# Patient Record
Sex: MALE | Race: OTHER | HISPANIC OR LATINO | URBAN - METROPOLITAN AREA
[De-identification: names, ages, dates, MRNs, and addresses within clinical notes are randomized per-mention and may not be internally consistent; named-entity substitution may affect disease eponyms.]

---

## 2017-11-28 ENCOUNTER — EMERGENCY (EMERGENCY)
Facility: HOSPITAL | Age: 47
LOS: 1 days | Discharge: HOME | End: 2017-11-28
Attending: EMERGENCY MEDICINE | Admitting: EMERGENCY MEDICINE
Payer: COMMERCIAL

## 2017-11-28 VITALS
SYSTOLIC BLOOD PRESSURE: 114 MMHG | OXYGEN SATURATION: 98 % | DIASTOLIC BLOOD PRESSURE: 60 MMHG | TEMPERATURE: 98 F | RESPIRATION RATE: 18 BRPM | HEART RATE: 75 BPM

## 2017-11-28 PROCEDURE — 99283 EMERGENCY DEPT VISIT LOW MDM: CPT

## 2017-11-28 NOTE — ED PROVIDER NOTE - PHYSICAL EXAMINATION
ATTENDING PHYSICAL EXAM DR. CADE ***GEN - NAD; well appearing; A+O x3   ***PULMONARY - CTA b/l, symmetric breath sounds. ***CARDIAC -s1s2, RRR, no M,G,R  ***ABDOMEN - +BS, ND, NT, soft, no guarding, no rebound, no masses   ***BACK - no CVA tenderness, Normal  spine ***EXTREMITIES - symmetric pulses, 2+ dp, capillary refill < 2 seconds, no clubbing, no cyanosis, no edema, +tenderness along extensor hallux tendon ***SKIN - no rash or bruising   ***NEUROLOGIC - alert

## 2017-11-28 NOTE — ED PROVIDER NOTE - OBJECTIVE STATEMENT
46 y/o M w/ no significant PMHx, presents to the ED c/o atraumatic, right ankle pain since last night, worse this morning. Pain is worse w/ bearing weight and improved w/ rest. Pt is currently ambulatory. Denies trauma, fall, numbness/tingling, weakness or any other complaints. NKDA.

## 2017-11-28 NOTE — ED PROVIDER NOTE - MEDICAL DECISION MAKING DETAILS
48 y/o M c/o atraumatic right ankle pain. Benign exam. Plan: supportive care, ace wrap, pain meds and dc home.

## 2019-01-03 ENCOUNTER — EMERGENCY (EMERGENCY)
Facility: HOSPITAL | Age: 49
LOS: 1 days | Discharge: HOME | End: 2019-01-03
Attending: EMERGENCY MEDICINE | Admitting: EMERGENCY MEDICINE
Payer: MEDICAID

## 2019-01-03 VITALS
HEART RATE: 67 BPM | DIASTOLIC BLOOD PRESSURE: 58 MMHG | TEMPERATURE: 98 F | RESPIRATION RATE: 18 BRPM | OXYGEN SATURATION: 99 % | SYSTOLIC BLOOD PRESSURE: 124 MMHG

## 2019-01-03 PROCEDURE — 99283 EMERGENCY DEPT VISIT LOW MDM: CPT

## 2019-01-03 RX ORDER — KETOROLAC TROMETHAMINE 30 MG/ML
60 SYRINGE (ML) INJECTION ONCE
Qty: 0 | Refills: 0 | Status: DISCONTINUED | OUTPATIENT
Start: 2019-01-03 | End: 2019-01-03

## 2019-01-03 RX ORDER — IBUPROFEN 200 MG
1 TABLET ORAL
Qty: 20 | Refills: 0 | OUTPATIENT
Start: 2019-01-03

## 2019-01-03 RX ORDER — CYCLOBENZAPRINE HYDROCHLORIDE 10 MG/1
1 TABLET, FILM COATED ORAL
Qty: 12 | Refills: 0 | OUTPATIENT
Start: 2019-01-03

## 2019-01-03 RX ORDER — CYCLOBENZAPRINE HYDROCHLORIDE 10 MG/1
10 TABLET, FILM COATED ORAL ONCE
Qty: 0 | Refills: 0 | Status: COMPLETED | OUTPATIENT
Start: 2019-01-03 | End: 2019-01-03

## 2019-01-03 RX ORDER — LIDOCAINE 4 G/100G
1 CREAM TOPICAL ONCE
Qty: 0 | Refills: 0 | Status: COMPLETED | OUTPATIENT
Start: 2019-01-03 | End: 2019-01-03

## 2019-01-03 RX ADMIN — CYCLOBENZAPRINE HYDROCHLORIDE 10 MILLIGRAM(S): 10 TABLET, FILM COATED ORAL at 13:02

## 2019-01-03 RX ADMIN — LIDOCAINE 1 PATCH: 4 CREAM TOPICAL at 13:02

## 2019-01-03 RX ADMIN — Medication 60 MILLIGRAM(S): at 13:02

## 2019-01-03 NOTE — ED PROVIDER NOTE - PROGRESS NOTE DETAILS
Pt states feeling better. Ambulating without any difficulty. Sent Flexeril and Naproxen to pharmacy. Given Ortho f/u.

## 2019-01-03 NOTE — ED PROVIDER NOTE - MUSCULOSKELETAL MINIMAL EXAM
Able to ambulate. No midline tenderness. No swelling. No fluctuants. 5/5 strength bilateral lower extremity. Positive DP

## 2019-01-03 NOTE — ED PROVIDER NOTE - CHPI ED SYMPTOMS NEG
no numbness/no weakness/no tingling/no fever. no chills. no nausea. no vomiting. no CP. no SOB. no abd pain

## 2019-01-03 NOTE — ED PROVIDER NOTE - NSFOLLOWUPINSTRUCTIONS_ED_ALL_ED_FT
Rest and avoid heavy lifting.   May apply warm pack to area, 20 min on, 20 min off.  May take Naproxen 500mg 2 times per day for moderate pain. Take with food.  May take Flexeril 10mg every 6 hours as needed for severe pain.   Follow up with Orthopedics as discussed.

## 2019-01-03 NOTE — ED PROVIDER NOTE - OBJECTIVE STATEMENT
48y M with previous hx of sciatica presents to the ED for low back/left leg pain x4 days. Pt states over the past 4 days whenever going from sitting to standing position having increased left low back pain radiating down to left leg. Pt denies fall or trauma to area. Denies numbness, tingling, or weakness. Denies loss of bowel or bladder incontinence. Denies fever, chills, nausea, vomiting, SP, SOB, or abd pain

## 2019-01-31 PROBLEM — M54.30 SCIATICA, UNSPECIFIED SIDE: Chronic | Status: ACTIVE | Noted: 2019-01-03

## 2019-02-01 ENCOUNTER — APPOINTMENT (OUTPATIENT)
Dept: ORTHOPEDIC SURGERY | Facility: CLINIC | Age: 49
End: 2019-02-01
Payer: MEDICAID

## 2019-02-01 VITALS
SYSTOLIC BLOOD PRESSURE: 115 MMHG | BODY MASS INDEX: 35.78 KG/M2 | HEIGHT: 73 IN | HEART RATE: 75 BPM | WEIGHT: 270 LBS | DIASTOLIC BLOOD PRESSURE: 79 MMHG

## 2019-02-01 DIAGNOSIS — M48.061 SPINAL STENOSIS, LUMBAR REGION WITHOUT NEUROGENIC CLAUDICATION: ICD-10-CM

## 2019-02-01 PROCEDURE — 72100 X-RAY EXAM L-S SPINE 2/3 VWS: CPT

## 2019-02-01 PROCEDURE — 99203 OFFICE O/P NEW LOW 30 MIN: CPT

## 2019-02-01 NOTE — PHYSICAL EXAM
[UE/LE] : Sensory: Intact in bilateral upper & lower extremities [ALL] : dorsalis pedis, posterior tibial, femoral, popliteal, and radial 2+ and symmetric bilaterally [Antalgic] : antalgic [SLR] : positive straight leg raise [de-identified] : Patient is antalgic to the left with 4-5 weakness tibialis anterior and extensor hallucis longus with decreased sensation in the L5 distribution to his right lower extremity otherwise, 5 out of 5 motor strength, sensation is intact and symmetrical full range of motion flexion extension and rotation, no palpatory tenderness full range of motion of hips knees shoulders and elbows (all four extremities), no atrophy, negative straight leg raise, no pathological reflexes, no swelling, normal ambulation, no apparent distress skin is intact, no palpable lymph nodes, no upper or lower extremity instability, alert and oriented x3 and normal mood. Normal finger-to nose test. He has a right positive straight leg raise at 20° reproducing pain past his knee.\par No perineal buttock numbness today. [de-identified] : MRI reveals a very large disc herniation in his lumbar spine at L4-5 producing severe spinal stenosis.\par AP lateral lumbar does not reveal any instability. Both reviewed with the patient.\par

## 2019-02-01 NOTE — HISTORY OF PRESENT ILLNESS
[Worsening] : worsening [Walking] : walking [Bending] : worsened by bending [Lifting] : worsened by lifting [de-identified] : Patient is miserable with right lower extremity pain numbness and tingling. Her going on for several weeks. He is miserable and worsening with a decreased quality of life.\par He try chiropractic care and different modalities and medications.\par He finds it difficult to go to work and difficult with his activities of daily living.\par Lower back pain\par No fever chills sweats nausea vomiting no bowel or bladder dysfunction, no recent weight loss or gain no night pain. This history is in addition to the intake form that I personally reviewed.\par

## 2019-02-01 NOTE — ADDENDUM
[FreeTextEntry1] : This note was authored by Richelle Salmon working as a medical scribe for Dr. Irvin Glover. The note was reviewed, edited, and revised by Dr. Irvin Glover whom is in agreement with the exam findings, imaging findings, and treatment plan. 02/01/2019.

## 2019-02-01 NOTE — REVIEW OF SYSTEMS
[Negative] : Heme/Lymph [Headache] : no headache [Dizziness] : no dizziness [Fainting] : no fainting

## 2019-02-01 NOTE — DISCUSSION/SUMMARY
[Surgical risks reviewed] : Surgical risks reviewed [de-identified] : Lumbar stenosis.\par Large herniated disc L4-5 producing severe stenosis.\par He has no signs of cauda equina syndrome.\par If he develops any apparent ill numbness of the numbness of bilaterally painful go to the emergency room.\par Risks of surgery include infection, dural tear, nerve root injury, reherniation, future back pain, future leg pain, retained fragment, hematoma, urinary retention, worsening leg symptoms, footdrop, anesthetic risks, blood transfusion risks, positioning pain, visceral and vascular injury, deep vein thrombosis, pulmonary embolus, and death. Patient will need spinal orthosis pre and post operatively. All risks were explained not exclusive to the ones mentioned alternatives were discussed and all questions were answered the patient agrees and understands the above and is in complete agreement with the plan. \par Website provided\par Laminectomy\par All options discussed including rest, medicine, home exercise, acupuncture, Chiropractic care, Physical Therapy, Pain management, and last resort surgery.\par All questions were answered, all alternatives discussed and the patient is in complete agreement with that plan. Follow-up appointment as instructed. Any issues and the patient will call or come in sooner.\par Surgery will be tentatively scheduled pending medical clearance and insurance approval.

## 2019-02-08 ENCOUNTER — OUTPATIENT (OUTPATIENT)
Dept: OUTPATIENT SERVICES | Facility: HOSPITAL | Age: 49
LOS: 1 days | End: 2019-02-08
Payer: MEDICAID

## 2019-02-08 VITALS
SYSTOLIC BLOOD PRESSURE: 116 MMHG | HEART RATE: 86 BPM | WEIGHT: 287.92 LBS | RESPIRATION RATE: 16 BRPM | OXYGEN SATURATION: 98 % | HEIGHT: 72.5 IN | DIASTOLIC BLOOD PRESSURE: 78 MMHG | TEMPERATURE: 98 F

## 2019-02-08 DIAGNOSIS — M51.26 OTHER INTERVERTEBRAL DISC DISPLACEMENT, LUMBAR REGION: ICD-10-CM

## 2019-02-08 DIAGNOSIS — D49.7 NEOPLASM OF UNSPECIFIED BEHAVIOR OF ENDOCRINE GLANDS AND OTHER PARTS OF NERVOUS SYSTEM: ICD-10-CM

## 2019-02-08 DIAGNOSIS — E11.9 TYPE 2 DIABETES MELLITUS WITHOUT COMPLICATIONS: ICD-10-CM

## 2019-02-08 LAB
ANION GAP SERPL CALC-SCNC: 13 MMO/L — SIGNIFICANT CHANGE UP (ref 7–14)
BLD GP AB SCN SERPL QL: NEGATIVE — SIGNIFICANT CHANGE UP
BUN SERPL-MCNC: 13 MG/DL — SIGNIFICANT CHANGE UP (ref 7–23)
CALCIUM SERPL-MCNC: 9.8 MG/DL — SIGNIFICANT CHANGE UP (ref 8.4–10.5)
CHLORIDE SERPL-SCNC: 101 MMOL/L — SIGNIFICANT CHANGE UP (ref 98–107)
CO2 SERPL-SCNC: 26 MMOL/L — SIGNIFICANT CHANGE UP (ref 22–31)
CREAT SERPL-MCNC: 0.81 MG/DL — SIGNIFICANT CHANGE UP (ref 0.5–1.3)
GLUCOSE SERPL-MCNC: 109 MG/DL — HIGH (ref 70–99)
HBA1C BLD-MCNC: 5.6 % — SIGNIFICANT CHANGE UP (ref 4–5.6)
HCT VFR BLD CALC: 48.2 % — SIGNIFICANT CHANGE UP (ref 39–50)
HGB BLD-MCNC: 15.7 G/DL — SIGNIFICANT CHANGE UP (ref 13–17)
MCHC RBC-ENTMCNC: 27.3 PG — SIGNIFICANT CHANGE UP (ref 27–34)
MCHC RBC-ENTMCNC: 32.6 % — SIGNIFICANT CHANGE UP (ref 32–36)
MCV RBC AUTO: 83.7 FL — SIGNIFICANT CHANGE UP (ref 80–100)
NRBC # FLD: 0 K/UL — LOW (ref 25–125)
PLATELET # BLD AUTO: 272 K/UL — SIGNIFICANT CHANGE UP (ref 150–400)
PMV BLD: 10.1 FL — SIGNIFICANT CHANGE UP (ref 7–13)
POTASSIUM SERPL-MCNC: 4.4 MMOL/L — SIGNIFICANT CHANGE UP (ref 3.5–5.3)
POTASSIUM SERPL-SCNC: 4.4 MMOL/L — SIGNIFICANT CHANGE UP (ref 3.5–5.3)
RBC # BLD: 5.76 M/UL — SIGNIFICANT CHANGE UP (ref 4.2–5.8)
RBC # FLD: 13.5 % — SIGNIFICANT CHANGE UP (ref 10.3–14.5)
RH IG SCN BLD-IMP: POSITIVE — SIGNIFICANT CHANGE UP
SODIUM SERPL-SCNC: 140 MMOL/L — SIGNIFICANT CHANGE UP (ref 135–145)
WBC # BLD: 7.67 K/UL — SIGNIFICANT CHANGE UP (ref 3.8–10.5)
WBC # FLD AUTO: 7.67 K/UL — SIGNIFICANT CHANGE UP (ref 3.8–10.5)

## 2019-02-08 PROCEDURE — 93010 ELECTROCARDIOGRAM REPORT: CPT

## 2019-02-08 RX ORDER — SODIUM CHLORIDE 9 MG/ML
1000 INJECTION, SOLUTION INTRAVENOUS
Qty: 0 | Refills: 0 | Status: DISCONTINUED | OUTPATIENT
Start: 2019-02-26 | End: 2019-02-27

## 2019-02-08 NOTE — H&P PST ADULT - NEGATIVE NEUROLOGICAL SYMPTOMS
no transient paralysis/no loss of sensation/no vertigo/no difficulty walking/no headache/no generalized seizures

## 2019-02-08 NOTE — H&P PST ADULT - NEGATIVE MUSCULOSKELETAL SYMPTOMS
no stiffness/no arm pain R/no muscle weakness/no muscle cramps/no neck pain/no arthralgia/no joint swelling/no myalgia

## 2019-02-08 NOTE — H&P PST ADULT - PRIMARY CARE PROVIDER
Dr. Alexy Duggan(PCP) [phone] Dr. Alexy Duggan(PCP) [phone], last appointment Dec 2018/Jan 2019

## 2019-02-08 NOTE — H&P PST ADULT - MUSCULOSKELETAL
details… detailed exam no joint erythema/no joint warmth/normal strength/no calf tenderness/no joint swelling/ROM intact

## 2019-02-08 NOTE — H&P PST ADULT - HISTORY OF PRESENT ILLNESS
48 yo male presents to PST.  Pt reports he has been having lower back pain about 1-2 months ago, becoming more severe radiating down to the left leg.  Pt reports MRI Jan 2019, revealed lumbar herniated disc.   Pt is scheduled for L4-5 lumbar laminectomy on 2/26/19. 47 yo male presents to PST.  Pt reports he has been having lower back pain about 1-2 months ago, becoming more severe radiating down to the left leg.  Pt reports MRI Jan 2019, revealed lumbar herniated disc.   Pt is scheduled for L4-5 lumbar laminectomy on 2/26/19.

## 2019-02-08 NOTE — H&P PST ADULT - NEGATIVE ENMT SYMPTOMS
no dysphagia/no tinnitus/no ear pain/no vertigo/no sinus symptoms/no nasal congestion/no hearing difficulty

## 2019-02-08 NOTE — H&P PST ADULT - PROBLEM SELECTOR PLAN 1
Pt is scheduled for L4-5 lumbar laminectomy on 2/26/19.  Pre op instructions including chlorhexidine wash given and pt able to verbalize understanding. Pt is scheduled for L4-5 lumbar laminectomy on 2/26/19.  Pre op instructions including chlorhexidine wash given and pt able to verbalize understanding.  Pt met STOP BANG score for RONNY precaution. OR booking notified via fax.

## 2019-02-08 NOTE — H&P PST ADULT - PMH
DM (diabetes mellitus)  Metformin discontinued 2012  Other intervertebral disc displacement, lumbar region    Pituitary tumor  dx in 2011  Sciatica    Seizure  last one at age 14, no longer under care of neurologist

## 2019-02-09 LAB — SPECIMEN SOURCE: SIGNIFICANT CHANGE UP

## 2019-02-11 LAB — BACTERIA NPH CULT: SIGNIFICANT CHANGE UP

## 2019-02-25 ENCOUNTER — TRANSCRIPTION ENCOUNTER (OUTPATIENT)
Age: 49
End: 2019-02-25

## 2019-02-26 ENCOUNTER — APPOINTMENT (OUTPATIENT)
Dept: ORTHOPEDIC SURGERY | Facility: HOSPITAL | Age: 49
End: 2019-02-26
Payer: MEDICAID

## 2019-02-26 ENCOUNTER — RESULT REVIEW (OUTPATIENT)
Age: 49
End: 2019-02-26

## 2019-02-26 ENCOUNTER — INPATIENT (INPATIENT)
Facility: HOSPITAL | Age: 49
LOS: 0 days | Discharge: HOME | End: 2019-02-27
Attending: ORTHOPAEDIC SURGERY | Admitting: ORTHOPAEDIC SURGERY
Payer: MEDICAID

## 2019-02-26 VITALS
WEIGHT: 279.99 LBS | HEART RATE: 63 BPM | HEIGHT: 73 IN | RESPIRATION RATE: 16 BRPM | SYSTOLIC BLOOD PRESSURE: 113 MMHG | DIASTOLIC BLOOD PRESSURE: 66 MMHG | TEMPERATURE: 98 F | OXYGEN SATURATION: 96 %

## 2019-02-26 DIAGNOSIS — M51.26 OTHER INTERVERTEBRAL DISC DISPLACEMENT, LUMBAR REGION: ICD-10-CM

## 2019-02-26 LAB
ANION GAP SERPL CALC-SCNC: 12 MMO/L — SIGNIFICANT CHANGE UP (ref 7–14)
BASOPHILS # BLD AUTO: 0.06 K/UL — SIGNIFICANT CHANGE UP (ref 0–0.2)
BASOPHILS NFR BLD AUTO: 0.6 % — SIGNIFICANT CHANGE UP (ref 0–2)
BUN SERPL-MCNC: 15 MG/DL — SIGNIFICANT CHANGE UP (ref 7–23)
CALCIUM SERPL-MCNC: 9.2 MG/DL — SIGNIFICANT CHANGE UP (ref 8.4–10.5)
CHLORIDE SERPL-SCNC: 103 MMOL/L — SIGNIFICANT CHANGE UP (ref 98–107)
CO2 SERPL-SCNC: 25 MMOL/L — SIGNIFICANT CHANGE UP (ref 22–31)
CREAT SERPL-MCNC: 0.97 MG/DL — SIGNIFICANT CHANGE UP (ref 0.5–1.3)
EOSINOPHIL # BLD AUTO: 0.12 K/UL — SIGNIFICANT CHANGE UP (ref 0–0.5)
EOSINOPHIL NFR BLD AUTO: 1.3 % — SIGNIFICANT CHANGE UP (ref 0–6)
GLUCOSE BLDC GLUCOMTR-MCNC: 103 MG/DL — HIGH (ref 70–99)
GLUCOSE SERPL-MCNC: 143 MG/DL — HIGH (ref 70–99)
HCT VFR BLD CALC: 40.3 % — SIGNIFICANT CHANGE UP (ref 39–50)
HGB BLD-MCNC: 13.2 G/DL — SIGNIFICANT CHANGE UP (ref 13–17)
IMM GRANULOCYTES NFR BLD AUTO: 0.9 % — SIGNIFICANT CHANGE UP (ref 0–1.5)
LYMPHOCYTES # BLD AUTO: 1.34 K/UL — SIGNIFICANT CHANGE UP (ref 1–3.3)
LYMPHOCYTES # BLD AUTO: 14.3 % — SIGNIFICANT CHANGE UP (ref 13–44)
MCHC RBC-ENTMCNC: 27.4 PG — SIGNIFICANT CHANGE UP (ref 27–34)
MCHC RBC-ENTMCNC: 32.8 % — SIGNIFICANT CHANGE UP (ref 32–36)
MCV RBC AUTO: 83.8 FL — SIGNIFICANT CHANGE UP (ref 80–100)
MONOCYTES # BLD AUTO: 0.23 K/UL — SIGNIFICANT CHANGE UP (ref 0–0.9)
MONOCYTES NFR BLD AUTO: 2.5 % — SIGNIFICANT CHANGE UP (ref 2–14)
NEUTROPHILS # BLD AUTO: 7.55 K/UL — HIGH (ref 1.8–7.4)
NEUTROPHILS NFR BLD AUTO: 80.4 % — HIGH (ref 43–77)
NRBC # FLD: 0 K/UL — LOW (ref 25–125)
PLATELET # BLD AUTO: 246 K/UL — SIGNIFICANT CHANGE UP (ref 150–400)
PMV BLD: 10.4 FL — SIGNIFICANT CHANGE UP (ref 7–13)
POTASSIUM SERPL-MCNC: 4.6 MMOL/L — SIGNIFICANT CHANGE UP (ref 3.5–5.3)
POTASSIUM SERPL-SCNC: 4.6 MMOL/L — SIGNIFICANT CHANGE UP (ref 3.5–5.3)
RBC # BLD: 4.81 M/UL — SIGNIFICANT CHANGE UP (ref 4.2–5.8)
RBC # FLD: 13.5 % — SIGNIFICANT CHANGE UP (ref 10.3–14.5)
RH IG SCN BLD-IMP: POSITIVE — SIGNIFICANT CHANGE UP
SODIUM SERPL-SCNC: 140 MMOL/L — SIGNIFICANT CHANGE UP (ref 135–145)
WBC # BLD: 9.38 K/UL — SIGNIFICANT CHANGE UP (ref 3.8–10.5)
WBC # FLD AUTO: 9.38 K/UL — SIGNIFICANT CHANGE UP (ref 3.8–10.5)

## 2019-02-26 PROCEDURE — 72100 X-RAY EXAM L-S SPINE 2/3 VWS: CPT | Mod: 26

## 2019-02-26 PROCEDURE — ZZZZZ: CPT

## 2019-02-26 PROCEDURE — 88304 TISSUE EXAM BY PATHOLOGIST: CPT | Mod: 26

## 2019-02-26 RX ORDER — FAMOTIDINE 10 MG/ML
20 INJECTION INTRAVENOUS EVERY 12 HOURS
Qty: 0 | Refills: 0 | Status: DISCONTINUED | OUTPATIENT
Start: 2019-02-26 | End: 2019-02-27

## 2019-02-26 RX ORDER — HYDROMORPHONE HYDROCHLORIDE 2 MG/ML
0.5 INJECTION INTRAMUSCULAR; INTRAVENOUS; SUBCUTANEOUS
Qty: 0 | Refills: 0 | Status: DISCONTINUED | OUTPATIENT
Start: 2019-02-26 | End: 2019-02-26

## 2019-02-26 RX ORDER — INFLUENZA VIRUS VACCINE 15; 15; 15; 15 UG/.5ML; UG/.5ML; UG/.5ML; UG/.5ML
0.5 SUSPENSION INTRAMUSCULAR ONCE
Qty: 0 | Refills: 0 | Status: COMPLETED | OUTPATIENT
Start: 2019-02-26 | End: 2019-02-26

## 2019-02-26 RX ORDER — HYDROMORPHONE HYDROCHLORIDE 2 MG/ML
0.5 INJECTION INTRAMUSCULAR; INTRAVENOUS; SUBCUTANEOUS EVERY 6 HOURS
Qty: 0 | Refills: 0 | Status: DISCONTINUED | OUTPATIENT
Start: 2019-02-26 | End: 2019-02-27

## 2019-02-26 RX ORDER — OXYCODONE HYDROCHLORIDE 5 MG/1
5 TABLET ORAL EVERY 4 HOURS
Qty: 0 | Refills: 0 | Status: DISCONTINUED | OUTPATIENT
Start: 2019-02-26 | End: 2019-02-27

## 2019-02-26 RX ORDER — SODIUM CHLORIDE 9 MG/ML
1000 INJECTION, SOLUTION INTRAVENOUS
Qty: 0 | Refills: 0 | Status: DISCONTINUED | OUTPATIENT
Start: 2019-02-26 | End: 2019-02-27

## 2019-02-26 RX ORDER — OXYCODONE HYDROCHLORIDE 5 MG/1
10 TABLET ORAL EVERY 4 HOURS
Qty: 0 | Refills: 0 | Status: DISCONTINUED | OUTPATIENT
Start: 2019-02-26 | End: 2019-02-27

## 2019-02-26 RX ORDER — ONDANSETRON 8 MG/1
4 TABLET, FILM COATED ORAL ONCE
Qty: 0 | Refills: 0 | Status: DISCONTINUED | OUTPATIENT
Start: 2019-02-26 | End: 2019-02-26

## 2019-02-26 RX ORDER — MAGNESIUM HYDROXIDE 400 MG/1
30 TABLET, CHEWABLE ORAL EVERY 12 HOURS
Qty: 0 | Refills: 0 | Status: DISCONTINUED | OUTPATIENT
Start: 2019-02-26 | End: 2019-02-27

## 2019-02-26 RX ORDER — SENNA PLUS 8.6 MG/1
2 TABLET ORAL AT BEDTIME
Qty: 0 | Refills: 0 | Status: DISCONTINUED | OUTPATIENT
Start: 2019-02-26 | End: 2019-02-27

## 2019-02-26 RX ORDER — ACETAMINOPHEN 500 MG
650 TABLET ORAL EVERY 6 HOURS
Qty: 0 | Refills: 0 | Status: DISCONTINUED | OUTPATIENT
Start: 2019-02-26 | End: 2019-02-27

## 2019-02-26 RX ORDER — GLUCAGON INJECTION, SOLUTION 0.5 MG/.1ML
1 INJECTION, SOLUTION SUBCUTANEOUS ONCE
Qty: 0 | Refills: 0 | Status: DISCONTINUED | OUTPATIENT
Start: 2019-02-26 | End: 2019-02-27

## 2019-02-26 RX ORDER — CEFAZOLIN SODIUM 1 G
2000 VIAL (EA) INJECTION EVERY 8 HOURS
Qty: 0 | Refills: 0 | Status: COMPLETED | OUTPATIENT
Start: 2019-02-27 | End: 2019-02-27

## 2019-02-26 RX ORDER — FENTANYL CITRATE 50 UG/ML
50 INJECTION INTRAVENOUS
Qty: 0 | Refills: 0 | Status: DISCONTINUED | OUTPATIENT
Start: 2019-02-26 | End: 2019-02-26

## 2019-02-26 RX ORDER — DOCUSATE SODIUM 100 MG
100 CAPSULE ORAL THREE TIMES A DAY
Qty: 0 | Refills: 0 | Status: DISCONTINUED | OUTPATIENT
Start: 2019-02-26 | End: 2019-02-27

## 2019-02-26 RX ORDER — CYCLOBENZAPRINE HYDROCHLORIDE 10 MG/1
10 TABLET, FILM COATED ORAL EVERY 8 HOURS
Qty: 0 | Refills: 0 | Status: DISCONTINUED | OUTPATIENT
Start: 2019-02-26 | End: 2019-02-27

## 2019-02-26 RX ORDER — GABAPENTIN 400 MG/1
100 CAPSULE ORAL THREE TIMES A DAY
Qty: 0 | Refills: 0 | Status: DISCONTINUED | OUTPATIENT
Start: 2019-02-26 | End: 2019-02-27

## 2019-02-26 RX ADMIN — Medication 100 MILLIGRAM(S): at 22:50

## 2019-02-26 RX ADMIN — OXYCODONE HYDROCHLORIDE 10 MILLIGRAM(S): 5 TABLET ORAL at 22:49

## 2019-02-26 RX ADMIN — GABAPENTIN 100 MILLIGRAM(S): 400 CAPSULE ORAL at 22:48

## 2019-02-26 RX ADMIN — OXYCODONE HYDROCHLORIDE 10 MILLIGRAM(S): 5 TABLET ORAL at 23:41

## 2019-02-26 RX ADMIN — SENNA PLUS 2 TABLET(S): 8.6 TABLET ORAL at 22:50

## 2019-02-26 NOTE — BRIEF OPERATIVE NOTE - PROCEDURE
<<-----Click on this checkbox to enter Procedure Laminectomy, decompressive, with discectomy, spine, lumbar, L4 to L5  02/26/2019    Active  ASATIN

## 2019-02-26 NOTE — PROGRESS NOTE ADULT - SUBJECTIVE AND OBJECTIVE BOX
ORTHO POST OP NOTE    Pt resting comfortably without complaint. No chest pain/no dizziness/no SOB. Reports pain is minimal.       Vital Signs Last 24 Hrs  T(C): 37.3 (26 Feb 2019 19:25), Max: 37.3 (26 Feb 2019 19:25)  T(F): 99.1 (26 Feb 2019 19:25), Max: 99.1 (26 Feb 2019 19:25)  HR: 65 (26 Feb 2019 20:00) (62 - 84)  BP: 107/91 (26 Feb 2019 20:00) (107/91 - 132/69)  BP(mean): 95 (26 Feb 2019 20:00) (80 - 95)  RR: 15 (26 Feb 2019 20:00) (10 - 17)  SpO2: 100% (26 Feb 2019 20:00) (94% - 100%)        Spine: Dressing/ Incision Clean/Dry/Intact  HV in place  BLE exam:  EHL/FHL/GC/TA 5/5                     Sensation grossly intact to light touch distal                     DP pulse 2+      A/P: 49y Male s/p L4/5 laminectomy/discectomy, POD #0    - WBAT  -DVT ppx- venodynes + ambulation  - Pain control  -Incentive spirometer  -FU AM labs  -PT/OT  -Continue to monitor. Notify Ortho with any questions. ORTHO POST OP NOTE    Pt resting comfortably without complaint. No chest pain/no dizziness/no SOB. Reports pain is minimal. Denies any numbness/tingling. No radicular symptoms at this time.       Vital Signs Last 24 Hrs  T(C): 37.3 (26 Feb 2019 19:25), Max: 37.3 (26 Feb 2019 19:25)  T(F): 99.1 (26 Feb 2019 19:25), Max: 99.1 (26 Feb 2019 19:25)  HR: 65 (26 Feb 2019 20:00) (62 - 84)  BP: 107/91 (26 Feb 2019 20:00) (107/91 - 132/69)  BP(mean): 95 (26 Feb 2019 20:00) (80 - 95)  RR: 15 (26 Feb 2019 20:00) (10 - 17)  SpO2: 100% (26 Feb 2019 20:00) (94% - 100%)        Spine: Dressing/ Incision Clean/Dry/Intact  HV in place  BLE exam:  EHL/FHL/GC/TA 5/5                     Sensation grossly intact to light touch distal                     DP pulse 2+      A/P: 49y Male s/p L4/5 laminectomy/discectomy, POD #0    - WBAT  -DVT ppx- venodynes + ambulation  - Pain control  -Incentive spirometer  -FU AM labs  -PT/OT  -Continue to monitor. Notify Ortho with any questions. ORTHO POST OP NOTE    Pt resting comfortably without complaint. No chest pain/no dizziness/no SOB. Reports pain is minimal. Denies any numbness/tingling. No radicular symptoms at this time.       Vital Signs Last 24 Hrs  T(C): 37.3 (26 Feb 2019 19:25), Max: 37.3 (26 Feb 2019 19:25)  T(F): 99.1 (26 Feb 2019 19:25), Max: 99.1 (26 Feb 2019 19:25)  HR: 65 (26 Feb 2019 20:00) (62 - 84)  BP: 107/91 (26 Feb 2019 20:00) (107/91 - 132/69)  BP(mean): 95 (26 Feb 2019 20:00) (80 - 95)  RR: 15 (26 Feb 2019 20:00) (10 - 17)  SpO2: 100% (26 Feb 2019 20:00) (94% - 100%)        Spine: Dressing/ Incision Clean/Dry/Intact  HV in place  BLE exam:  EHL/FHL/GC/TA 5/5                     Sensation grossly intact to light touch distal                     DP pulse 2+      A/P: 49y Male s/p L4/5 laminectomy/discectomy, POD #0    - WBAT  -DVT ppx- venodynes + ambulation  - Pain control  -Incentive spirometer  -FU AM labs  -PT/OT  -Continue to monitor. Notify Ortho with any questions.  I discussed and agree with the above, Dr. Irvin Glover.

## 2019-02-27 ENCOUNTER — TRANSCRIPTION ENCOUNTER (OUTPATIENT)
Age: 49
End: 2019-02-27

## 2019-02-27 ENCOUNTER — INBOUND DOCUMENT (OUTPATIENT)
Age: 49
End: 2019-02-27

## 2019-02-27 VITALS
SYSTOLIC BLOOD PRESSURE: 103 MMHG | HEART RATE: 81 BPM | OXYGEN SATURATION: 100 % | RESPIRATION RATE: 18 BRPM | TEMPERATURE: 98 F | DIASTOLIC BLOOD PRESSURE: 64 MMHG

## 2019-02-27 DIAGNOSIS — Z29.9 ENCOUNTER FOR PROPHYLACTIC MEASURES, UNSPECIFIED: ICD-10-CM

## 2019-02-27 DIAGNOSIS — D72.829 ELEVATED WHITE BLOOD CELL COUNT, UNSPECIFIED: ICD-10-CM

## 2019-02-27 DIAGNOSIS — E11.9 TYPE 2 DIABETES MELLITUS WITHOUT COMPLICATIONS: ICD-10-CM

## 2019-02-27 DIAGNOSIS — R56.9 UNSPECIFIED CONVULSIONS: ICD-10-CM

## 2019-02-27 DIAGNOSIS — Z98.1 ARTHRODESIS STATUS: ICD-10-CM

## 2019-02-27 LAB
ANION GAP SERPL CALC-SCNC: 13 MMO/L — SIGNIFICANT CHANGE UP (ref 7–14)
BASOPHILS # BLD AUTO: 0.02 K/UL — SIGNIFICANT CHANGE UP (ref 0–0.2)
BASOPHILS NFR BLD AUTO: 0.2 % — SIGNIFICANT CHANGE UP (ref 0–2)
BUN SERPL-MCNC: 11 MG/DL — SIGNIFICANT CHANGE UP (ref 7–23)
CALCIUM SERPL-MCNC: 9.2 MG/DL — SIGNIFICANT CHANGE UP (ref 8.4–10.5)
CHLORIDE SERPL-SCNC: 102 MMOL/L — SIGNIFICANT CHANGE UP (ref 98–107)
CO2 SERPL-SCNC: 24 MMOL/L — SIGNIFICANT CHANGE UP (ref 22–31)
CREAT SERPL-MCNC: 0.75 MG/DL — SIGNIFICANT CHANGE UP (ref 0.5–1.3)
EOSINOPHIL # BLD AUTO: 0 K/UL — SIGNIFICANT CHANGE UP (ref 0–0.5)
EOSINOPHIL NFR BLD AUTO: 0 % — SIGNIFICANT CHANGE UP (ref 0–6)
GLUCOSE SERPL-MCNC: 136 MG/DL — HIGH (ref 70–99)
HCT VFR BLD CALC: 41.8 % — SIGNIFICANT CHANGE UP (ref 39–50)
HGB BLD-MCNC: 13.7 G/DL — SIGNIFICANT CHANGE UP (ref 13–17)
IMM GRANULOCYTES NFR BLD AUTO: 0.8 % — SIGNIFICANT CHANGE UP (ref 0–1.5)
LYMPHOCYTES # BLD AUTO: 0.82 K/UL — LOW (ref 1–3.3)
LYMPHOCYTES # BLD AUTO: 7 % — LOW (ref 13–44)
MCHC RBC-ENTMCNC: 27 PG — SIGNIFICANT CHANGE UP (ref 27–34)
MCHC RBC-ENTMCNC: 32.8 % — SIGNIFICANT CHANGE UP (ref 32–36)
MCV RBC AUTO: 82.4 FL — SIGNIFICANT CHANGE UP (ref 80–100)
MONOCYTES # BLD AUTO: 0.56 K/UL — SIGNIFICANT CHANGE UP (ref 0–0.9)
MONOCYTES NFR BLD AUTO: 4.8 % — SIGNIFICANT CHANGE UP (ref 2–14)
NEUTROPHILS # BLD AUTO: 10.26 K/UL — HIGH (ref 1.8–7.4)
NEUTROPHILS NFR BLD AUTO: 87.2 % — HIGH (ref 43–77)
NRBC # FLD: 0 K/UL — LOW (ref 25–125)
PLATELET # BLD AUTO: 307 K/UL — SIGNIFICANT CHANGE UP (ref 150–400)
PMV BLD: 10.4 FL — SIGNIFICANT CHANGE UP (ref 7–13)
POTASSIUM SERPL-MCNC: 4.3 MMOL/L — SIGNIFICANT CHANGE UP (ref 3.5–5.3)
POTASSIUM SERPL-SCNC: 4.3 MMOL/L — SIGNIFICANT CHANGE UP (ref 3.5–5.3)
RBC # BLD: 5.07 M/UL — SIGNIFICANT CHANGE UP (ref 4.2–5.8)
RBC # FLD: 13.4 % — SIGNIFICANT CHANGE UP (ref 10.3–14.5)
SODIUM SERPL-SCNC: 139 MMOL/L — SIGNIFICANT CHANGE UP (ref 135–145)
WBC # BLD: 11.75 K/UL — HIGH (ref 3.8–10.5)
WBC # FLD AUTO: 11.75 K/UL — HIGH (ref 3.8–10.5)

## 2019-02-27 PROCEDURE — 99223 1ST HOSP IP/OBS HIGH 75: CPT

## 2019-02-27 RX ORDER — DIAZEPAM 5 MG
5 TABLET ORAL EVERY 8 HOURS
Qty: 0 | Refills: 0 | Status: DISCONTINUED | OUTPATIENT
Start: 2019-02-27 | End: 2019-02-27

## 2019-02-27 RX ORDER — OXYCODONE HYDROCHLORIDE 5 MG/1
1 TABLET ORAL
Qty: 42 | Refills: 0 | OUTPATIENT
Start: 2019-02-27 | End: 2019-03-05

## 2019-02-27 RX ORDER — OXYCODONE HYDROCHLORIDE 5 MG/1
1 TABLET ORAL
Qty: 42 | Refills: 0
Start: 2019-02-27 | End: 2019-03-05

## 2019-02-27 RX ORDER — IBUPROFEN 200 MG
1 TABLET ORAL
Qty: 0 | Refills: 0 | COMMUNITY

## 2019-02-27 RX ORDER — DIAZEPAM 5 MG
1 TABLET ORAL
Qty: 14 | Refills: 0
Start: 2019-02-27 | End: 2019-03-05

## 2019-02-27 RX ORDER — DIAZEPAM 5 MG
5 TABLET ORAL EVERY 6 HOURS
Qty: 0 | Refills: 0 | Status: DISCONTINUED | OUTPATIENT
Start: 2019-02-27 | End: 2019-02-27

## 2019-02-27 RX ORDER — DOCUSATE SODIUM 100 MG
1 CAPSULE ORAL
Qty: 0 | Refills: 0 | DISCHARGE
Start: 2019-02-27

## 2019-02-27 RX ORDER — ACETAMINOPHEN 500 MG
2 TABLET ORAL
Qty: 0 | Refills: 0 | DISCHARGE
Start: 2019-02-27

## 2019-02-27 RX ORDER — DIAZEPAM 5 MG
1 TABLET ORAL
Qty: 14 | Refills: 0 | OUTPATIENT
Start: 2019-02-27 | End: 2019-03-05

## 2019-02-27 RX ORDER — SENNA PLUS 8.6 MG/1
2 TABLET ORAL
Qty: 0 | Refills: 0 | DISCHARGE
Start: 2019-02-27

## 2019-02-27 RX ADMIN — Medication 5 MILLIGRAM(S): at 07:07

## 2019-02-27 RX ADMIN — OXYCODONE HYDROCHLORIDE 10 MILLIGRAM(S): 5 TABLET ORAL at 10:37

## 2019-02-27 RX ADMIN — GABAPENTIN 100 MILLIGRAM(S): 400 CAPSULE ORAL at 06:17

## 2019-02-27 RX ADMIN — Medication 100 MILLIGRAM(S): at 01:00

## 2019-02-27 RX ADMIN — OXYCODONE HYDROCHLORIDE 10 MILLIGRAM(S): 5 TABLET ORAL at 07:15

## 2019-02-27 RX ADMIN — CYCLOBENZAPRINE HYDROCHLORIDE 10 MILLIGRAM(S): 10 TABLET, FILM COATED ORAL at 00:08

## 2019-02-27 RX ADMIN — Medication 100 MILLIGRAM(S): at 06:17

## 2019-02-27 RX ADMIN — Medication 100 MILLIGRAM(S): at 13:43

## 2019-02-27 RX ADMIN — OXYCODONE HYDROCHLORIDE 10 MILLIGRAM(S): 5 TABLET ORAL at 06:17

## 2019-02-27 RX ADMIN — GABAPENTIN 100 MILLIGRAM(S): 400 CAPSULE ORAL at 13:43

## 2019-02-27 RX ADMIN — Medication 5 MILLIGRAM(S): at 15:57

## 2019-02-27 RX ADMIN — OXYCODONE HYDROCHLORIDE 10 MILLIGRAM(S): 5 TABLET ORAL at 11:30

## 2019-02-27 RX ADMIN — Medication 100 MILLIGRAM(S): at 09:46

## 2019-02-27 NOTE — DISCHARGE NOTE ADULT - CARE PROVIDER_API CALL
Irvin Glover (MD; DC)  Orthopaedic Surgery  [address], [address]  [place] [address]  Phone: [phone]  Fax: [phone]  Follow Up Time:

## 2019-02-27 NOTE — PROGRESS NOTE ADULT - SUBJECTIVE AND OBJECTIVE BOX
ORTHO PROGRESS NOTE     Subjective: Pt seen and examined at bedside, complaining of low back pain poorly controlled with current medications, resistant to move in bed d/t pain.  Denies any chest pain, SOB, nausea/vomiting, fevers/chills.     Vital Signs Last 24 Hrs  T(C): 36.7 (27 Feb 2019 06:14), Max: 37.3 (26 Feb 2019 19:25)  T(F): 98 (27 Feb 2019 06:14), Max: 99.1 (26 Feb 2019 19:25)  HR: 66 (27 Feb 2019 06:14) (62 - 97)  BP: 103/61 (27 Feb 2019 06:14) (101/56 - 132/69)  BP(mean): 67 (26 Feb 2019 21:15) (62 - 95)  RR: 16 (27 Feb 2019 06:14) (10 - 24)  SpO2: 99% (27 Feb 2019 06:14) (94% - 100%)    Physical exam:  Gen: resting in bed, appears slightly uncomfortable  Back: Dressing Clean/Dry/Intact,  HV intact with SS output  LLE: IP/TA/EHL/GSC 5/5                     Sensation intact                      foot wwp  RLE: IP/TA/EHL/GSC 5/5                     Sensation intact                      foot wwp    Labs:                          13.2   9.38  )-----------( 246      ( 26 Feb 2019 19:50 )             40.3     02-26    140  |  103  |  15  ----------------------------<  143<H>  4.6   |  25  |  0.97    Ca    9.2      26 Feb 2019 19:50

## 2019-02-27 NOTE — PROGRESS NOTE ADULT - ASSESSMENT
A/P: 49y Male s/p L4/5 laminectomy/discectomy, POD #1.    - WBAT  -DVT ppx- venodynes + ambulation  - Pain control - augment with valium  -Incentive spirometer  -FU AM labs  -PT/OT A/P: 49y Male s/p L4/5 laminectomy/discectomy, POD #1.    - WBAT  -DVT ppx- venodynes + ambulation  - Pain control - augment with valium  -Incentive spirometer  -FU AM labs  -PT/OT  Patient seen and examined today on rounds, complete resolution of pre-operative leg symptoms, I discussed and agree with the above, Dr. Irvin Glover.

## 2019-02-27 NOTE — CONSULT NOTE ADULT - SUBJECTIVE AND OBJECTIVE BOX
CHIEF COMPLAINT: Patient is a 49y old  Male who presents with a chief complaint of s/p elective L4/5 laminectomy/discectomy (27 Feb 2019 11:45)    HPI: 48M h/o intervertebral disc displacement, lumbar region, childhood seizure (last one at age 14), pituitary tumor (2011) and a h/o DM  now off metformin (2012) presents c/o he has been having lower back pain about 1-2 months ago, becoming more severe radiating down to the left leg.  Pt reports MRI Jan 2019, revealed lumbar herniated disc.   Pt is scheduled for L4-5 lumbar laminectomy on 2/26/19.    Patient seen and examined. No acute events overnight. Pain well controlled and patient without any complaints.     Pain Symptoms if applicable:             	                         none	   mild         moderate         severe  	                            0	    1-3	     4-6	         7-10  Pain: 5  Location: back	  Modifying factors: pain with movement  Associated symptoms: discomfort with walking    Allergies: No Known Allergies    HOME MEDICATIONS: [x] Reviewed    MEDICATIONS  (STANDING):  dextrose 5%. 1000 milliLiter(s) (50 mL/Hr) IV Continuous <Continuous>  docusate sodium 100 milliGRAM(s) Oral three times a day  gabapentin 100 milliGRAM(s) Oral three times a day  lactated ringers. 1000 milliLiter(s) (30 mL/Hr) IV Continuous <Continuous>  lactated ringers. 1000 milliLiter(s) (100 mL/Hr) IV Continuous <Continuous>  senna 2 Tablet(s) Oral at bedtime    MEDICATIONS  (PRN):  acetaminophen   Tablet .. 650 milliGRAM(s) Oral every 6 hours PRN Temp greater or equal to 38C (100.4F), Mild Pain (1 - 3)  diazepam    Tablet 5 milliGRAM(s) Oral every 8 hours PRN spasm  famotidine    Tablet 20 milliGRAM(s) Oral every 12 hours PRN Dyspepsia  glucagon  Injectable 1 milliGRAM(s) IntraMuscular once PRN Glucose LESS THAN 70 milligrams/deciliter  magnesium hydroxide Suspension 30 milliLiter(s) Oral every 12 hours PRN Constipation  oxyCODONE    IR 5 milliGRAM(s) Oral every 4 hours PRN Moderate Pain (4 - 6)  oxyCODONE    IR 10 milliGRAM(s) Oral every 4 hours PRN Severe Pain (7 - 10)    PAST MEDICAL & SURGICAL HISTORY:  Other intervertebral disc displacement, lumbar region  Seizure: last one at age 14, no longer under care of neurologist  Pituitary tumor: dx in 2011  Sciatica  DM (diabetes mellitus): Metformin discontinued 2012  Cyst near coccyx: removed 2009  [x ] Reviewed     FAMILY HISTORY:  Family history of diabetes mellitus (Sibling)  [x] No pertinent family history in first degree relatives     SOCIAL HISTORY:  · Marital Status	Single	  · Occupation	unemployed	  · Lives With	alone	    Substance Use History:  · Substance Use	caffeine	  · Caffeine Type	coffee	  · Caffeine Amount/Frequency	3-4 cups/cans per day	  · Substance Use Comment	Denies recreational drug use	    Alcohol Use History:  · Have you ever consumed alcohol	never	    Tobacco Usage:  · Tobacco Usage: Current every day smoker	  · Tobacco Type: cigarettes	  · Number of Packs per Day: 0.5	  · Number of yrs: 20	  · Pack yrs: 10	      REVIEW OF SYSTEMS:  [x] All other ROS negative     Vital Signs Last 24 Hrs  T(C): 36.8 (27 Feb 2019 09:41), Max: 37.3 (26 Feb 2019 19:25)  T(F): 98.2 (27 Feb 2019 09:41), Max: 99.1 (26 Feb 2019 19:25)  HR: 88 (27 Feb 2019 09:41) (62 - 97)  BP: 106/60 (27 Feb 2019 09:41) (101/56 - 132/69)  BP(mean): 67 (26 Feb 2019 21:15) (62 - 95)  RR: 18 (27 Feb 2019 09:41) (10 - 24)  SpO2: 99% (27 Feb 2019 09:41) (94% - 100%)    PHYSICAL EXAM:  GENERAL: NAD, well-groomed, well-developed  HEAD:  Atraumatic, Normocephalic  EYES: EOMI, PERRLA, conjunctiva and sclera clear  ENMT: Moist mucous membranes  NECK: Supple, No JVD  RESPIRATORY: Clear to auscultation bilaterally; No rales, rhonchi, wheezing, or rubs  CARDIOVASCULAR: Regular rate and rhythm; No murmurs, rubs, or gallops  GASTROINTESTINAL: Soft, Nontender, Nondistended; Bowel sounds present  EXTREMITIES:  2+ Peripheral Pulses, No clubbing, cyanosis, or edema  NERVOUS SYSTEM:  Alert & Oriented X3; Moving all 4 extremities; No gross sensory deficits  SKIN: No rashes or lesions; Incisions C/D/I; +HV    LABS:                        13.7   11.75 )-----------( 307      ( 27 Feb 2019 06:28 )             41.8     Hemoglobin: 13.7 g/dL (02-27 @ 06:28)  Hemoglobin: 13.2 g/dL (02-26 @ 19:50)    02-27    139  |  102  |  11  ----------------------------<  136<H>  4.3   |  24  |  0.75    Ca    9.2      27 Feb 2019 06:28    RADIOLOGY & ADDITIONAL STUDIES:    Imaging:   Personally Reviewed:  [x] YES   < from: Xray Lumbar Spine AP + Lateral (02.26.19 @ 20:43) >  IMPRESSION:  Distal tip of a surgical clamp projects over what appears to be the L5   spinous process.   Correlate with preoperative workup and intraoperative findings.   < end of copied text >                [ ] Consultant(s) Notes Reviewed  [x] Care Discussed with Consultants/Other Providers: Ortho PA - discussed disposition

## 2019-02-27 NOTE — DISCHARGE NOTE ADULT - HOSPITAL COURSE
72 y/o M status post Elective L4-L5 Laminectomy, R L4-L5 Discectomy, Partial S1 Laminectomy secondary to HNP with concomitant stenosis- POD #1.  The patient tolerated the procedure well and had an uneventful post-operative course. He has met functional goals with physical therapy.  Incisional pain is controlled as per patient.  Lower extremity radicular pain has resolved after surgical decompression.   HV drain was discontinued as indicated without any issues.  Incision is well-approximated, no erythema/swelling/drainage.  Dressing adherent; gauze and tegaderm.  Keep dressing clean, dry, intact. Change dressing as needed.  Materials provided.    Restrictions for at least 8 weeks; no strenuous activity, no repetitive bending/lifting/twisting.  No lift/push/pull/carry objects greater than 10 lbs.

## 2019-02-27 NOTE — DISCHARGE NOTE ADULT - CARE PLAN
Principal Discharge DX:	Spinal stenosis of lumbar region, unspecified whether neurogenic claudication present  Goal:	pain control, ambulation, functional status  Assessment and plan of treatment:	48 y/o M status post Elective L4-L5 Laminectomy secondary to HNP with concomitant stenosis- POD #1.  The patient tolerated the procedure well and had an uneventful post-operative course. He has met functional goals with physical therapy.  Incisional pain is controlled as per patient.  Lower extremity radicular pain has resolved after surgical decompression.   HV drain was discontinued as indicated without any issues.  Incision is well-approximated, no erythema/swelling/drainage.  Dressing adherent; gauze and tegaderm.  Keep dressing clean, dry, intact. Change dressing as needed.  Materials provided.    Restrictions for at least 8 weeks; no strenuous activity, no repetitive bending/lifting/twisting.  No lift/push/pull/carry objects greater than 10 lbs.

## 2019-02-27 NOTE — DISCHARGE NOTE ADULT - PLAN OF CARE
pain control, ambulation, functional status 50 y/o M status post Elective L4-L5 Laminectomy secondary to HNP with concomitant stenosis- POD #1.  The patient tolerated the procedure well and had an uneventful post-operative course. He has met functional goals with physical therapy.  Incisional pain is controlled as per patient.  Lower extremity radicular pain has resolved after surgical decompression.   HV drain was discontinued as indicated without any issues.  Incision is well-approximated, no erythema/swelling/drainage.  Dressing adherent; gauze and tegaderm.  Keep dressing clean, dry, intact. Change dressing as needed.  Materials provided.    Restrictions for at least 8 weeks; no strenuous activity, no repetitive bending/lifting/twisting.  No lift/push/pull/carry objects greater than 10 lbs.

## 2019-02-27 NOTE — DISCHARGE NOTE ADULT - PATIENT PORTAL LINK FT
You can access the [facility] Patient Portal, offered by [facility], by registering with the following website: [email or web address]

## 2019-02-27 NOTE — CONSULT NOTE ADULT - ASSESSMENT
48M h/o intervertebral disc displacement, lumbar region, childhood seizure (last one at age 14), pituitary tumor (2011) and a h/o DM  now off metformin (2012) presents with lumbar herniated disc now s/p L4-5 lumbar laminectomy on 2/26/19.

## 2019-02-27 NOTE — DISCHARGE NOTE ADULT - MEDICATION SUMMARY - MEDICATIONS TO STOP TAKING
I will STOP taking the medications listed below when I get home from the hospital:    ibuprofen 800 mg oral tablet  -- 1 tab(s) by mouth 3 times a day, As Needed. Last dose 2/18/19.

## 2019-02-27 NOTE — DISCHARGE NOTE ADULT - CONDITIONS AT DISCHARGE
Back dressing clean, dry and intact.  All extremities mobile and warm.  +NVS, +void, tolerated po and fluids

## 2019-02-27 NOTE — DISCHARGE NOTE ADULT - MEDICATION SUMMARY - MEDICATIONS TO TAKE
I will START or STAY ON the medications listed below when I get home from the hospital:    acetaminophen 325 mg oral tablet  -- 2 tab(s) by mouth every 6 hours, As needed, Temp greater or equal to 38C (100.4F), Mild Pain (1 - 3)  -- Indication: For pain and or fever    oxyCODONE 5 mg oral tablet  -- 1-2 tabs by mouth every 4-6 hours as needed for pain MDD:8  -- Indication: For pain    diazePAM 5 mg oral tablet  -- 1 tab(s) by mouth every 12 hours, As Needed -spasm MDD:2  -- Indication: For Spasm    gabapentin 100 mg oral capsule  -- 1 cap(s) by mouth 3 times a day  -- Indication: For Nerve pain    docusate sodium 100 mg oral capsule  -- 1 cap(s) by mouth 3 times a day  -- Indication: For Stool softener    senna oral tablet  -- 2 tab(s) by mouth once a day (at bedtime)  -- Indication: For Stool softener    cabergoline 0.5 mg oral tablet  -- 1 tab(s) by mouth 2 times a week  -- Indication: For DM (diabetes mellitus)

## 2019-02-27 NOTE — PHYSICAL THERAPY INITIAL EVALUATION ADULT - DID THE PATIENT HAVE SURGERY?
L4, L5, S1 laminectomy; decompression of the L4, 5, and S1 nerve root; removal of a disk herniation from the left side; inspection of the disk from the right./yes

## 2019-02-27 NOTE — CONSULT NOTE ADULT - PROBLEM SELECTOR RECOMMENDATION 9
Pain well controlled; continue management and pain control per ortho recs with oxycodone IR prn and neurontin tid

## 2019-02-27 NOTE — DISCHARGE NOTE ADULT - INSTRUCTIONS
Call MD for any c/o numbness, tingling, swelling, pain not relieved after medicated for pain, and a return appointment.  Tae over the counter stool softener to prevent constipation that can be a side effect from taking pain medication. may resume regular diet

## 2019-02-27 NOTE — DISCHARGE NOTE ADULT - NS AS ACTIVITY OBS
Showering allowed/Walking-Indoors allowed/Do not make important decisions/Do not drive or operate machinery/Stairs allowed/Walking-Outdoors allowed/No Heavy lifting/straining

## 2019-03-05 PROBLEM — M51.26 OTHER INTERVERTEBRAL DISC DISPLACEMENT, LUMBAR REGION: Chronic | Status: ACTIVE | Noted: 2019-02-08

## 2019-03-05 PROBLEM — D49.7 NEOPLASM OF UNSPECIFIED BEHAVIOR OF ENDOCRINE GLANDS AND OTHER PARTS OF NERVOUS SYSTEM: Chronic | Status: ACTIVE | Noted: 2019-02-08

## 2019-03-05 PROBLEM — R56.9 UNSPECIFIED CONVULSIONS: Chronic | Status: ACTIVE | Noted: 2019-02-08

## 2019-03-08 LAB — SURGICAL PATHOLOGY STUDY: SIGNIFICANT CHANGE UP

## 2019-03-11 ENCOUNTER — APPOINTMENT (OUTPATIENT)
Dept: ORTHOPEDIC SURGERY | Facility: CLINIC | Age: 49
End: 2019-03-11
Payer: MEDICAID

## 2019-03-11 VITALS
SYSTOLIC BLOOD PRESSURE: 113 MMHG | HEART RATE: 76 BPM | BODY MASS INDEX: 37.11 KG/M2 | WEIGHT: 280 LBS | HEIGHT: 73 IN | DIASTOLIC BLOOD PRESSURE: 69 MMHG

## 2019-03-11 PROCEDURE — 99024 POSTOP FOLLOW-UP VISIT: CPT

## 2019-03-11 PROCEDURE — 72040 X-RAY EXAM NECK SPINE 2-3 VW: CPT

## 2019-03-11 NOTE — HISTORY OF PRESENT ILLNESS
[Walking] : walking [Bending] : worsened by bending [Lifting] : worsened by lifting [Improving] : improving [de-identified] : 2 weeks post op Lumbar laminectomy discectomy.\par Extremely happy with surgery.\par He has complete resolution of his preoperative leg pain.\par No fever chills sweats nausea vomiting no bowel or bladder dysfunction, no recent weight loss or gain no night pain. This history is in addition to the intake form that I personally reviewed.\par

## 2019-03-11 NOTE — PHYSICAL EXAM
[UE/LE] : Sensory: Intact in bilateral upper & lower extremities [ALL] : dorsalis pedis, posterior tibial, femoral, popliteal, and radial 2+ and symmetric bilaterally [Antalgic] : not antalgic [de-identified] : 5 out of 5 motor strength, sensation is intact and symmetrical full range of motion flexion extension and rotation, no palpatory tenderness full range of motion of hips knees shoulders and elbows (all four extremities), no atrophy, negative straight leg raise, no pathological reflexes, no swelling, normal ambulation, no apparent distress skin is intact, no palpable lymph nodes, no upper or lower extremity instability, alert and oriented x3 and normal mood. Normal finger-to nose test. His incision is healed.\par  [de-identified] : AP/lat lumbar:Reveals a laminectomy performed without any antalgia-reviewed with the patient.\par

## 2019-03-11 NOTE — DISCUSSION/SUMMARY
[Surgical risks reviewed] : Surgical risks reviewed [de-identified] : 2 weeks Post op Laminectomy/discectomy- doing great.\par We discussed all the do's and don'ts. He would like to go back to work.\par Happy with surgery.\par Followup in 4 weeks.\par He go back to work in a limited capacity without any lifting or twisting.\par All options discussed including rest, medicine, home exercise, acupuncture, Chiropractic care, Physical Therapy, Pain management, and last resort surgery. \par All questions were answered, all alternatives discussed and the patient is in complete agreement with that plan. Follow-up appointment as instructed. Any issues and the patient will call or come in sooner.

## 2019-03-11 NOTE — ADDENDUM
[FreeTextEntry1] : This note was authored by Bettie Reis working as a medical scribe for Dr. Irvin Glover. The note was reviewed, edited, and revised by Dr. Irvin Glover whom is in agreement with the exam findings, imaging findings, and treatment plan. 02/01/2019.

## 2019-04-08 ENCOUNTER — APPOINTMENT (OUTPATIENT)
Dept: ORTHOPEDIC SURGERY | Facility: CLINIC | Age: 49
End: 2019-04-08
Payer: MEDICAID

## 2019-04-08 VITALS
DIASTOLIC BLOOD PRESSURE: 72 MMHG | BODY MASS INDEX: 37.11 KG/M2 | WEIGHT: 280 LBS | HEIGHT: 73 IN | SYSTOLIC BLOOD PRESSURE: 106 MMHG | HEART RATE: 86 BPM

## 2019-04-08 DIAGNOSIS — M54.16 RADICULOPATHY, LUMBAR REGION: ICD-10-CM

## 2019-04-08 DIAGNOSIS — M48.07 SPINAL STENOSIS, LUMBOSACRAL REGION: ICD-10-CM

## 2019-04-08 DIAGNOSIS — M51.26 OTHER INTERVERTEBRAL DISC DISPLACEMENT, LUMBAR REGION: ICD-10-CM

## 2019-04-08 PROCEDURE — 99024 POSTOP FOLLOW-UP VISIT: CPT

## 2019-04-08 NOTE — PHYSICAL EXAM
[UE/LE] : Sensory: Intact in bilateral upper & lower extremities [ALL] : dorsalis pedis, posterior tibial, femoral, popliteal, and radial 2+ and symmetric bilaterally [Antalgic] : not antalgic [de-identified] : 5 out of 5 motor strength, sensation is intact and symmetrical full range of motion flexion extension and rotation, no palpatory tenderness full range of motion of hips knees shoulders and elbows (all four extremities), no atrophy, negative straight leg raise, no pathological reflexes, no swelling, normal ambulation, no apparent distress skin is intact, no palpable lymph nodes, no upper or lower extremity instability, alert and oriented x3 and normal mood. Normal finger-to nose test. His incision is healed.\par

## 2019-04-08 NOTE — HISTORY OF PRESENT ILLNESS
[Improving] : improving [de-identified] : 6 weeks post op Lumbar laminectomy discectomy.\par Extremely happy with surgery.\par He has complete resolution of his preoperative leg pain.\par No fever chills sweats nausea vomiting no bowel or bladder dysfunction, no recent weight loss or gain no night pain. This history is in addition to the intake form that I personally reviewed.\par

## 2019-04-08 NOTE — DISCUSSION/SUMMARY
[Surgical risks reviewed] : Surgical risks reviewed [de-identified] : 6 weeks Post op Laminectomy/discectomy- doing great.\par We discussed all the do's and don'ts. He would like to go back to work.\par Happy with surgery.\par Followup in 2 months \par He go back to work in a limited capacity without any lifting or twisting.\par All options discussed including rest, medicine, home exercise, acupuncture, Chiropractic care, Physical Therapy, Pain management, and last resort surgery. \par All questions were answered, all alternatives discussed and the patient is in complete agreement with that plan. Follow-up appointment as instructed. Any issues and the patient will call or come in sooner.

## 2019-05-27 ENCOUNTER — FORM ENCOUNTER (OUTPATIENT)
Age: 49
End: 2019-05-27

## 2019-07-20 ENCOUNTER — EMERGENCY (EMERGENCY)
Facility: HOSPITAL | Age: 49
LOS: 1 days | Discharge: HOME | End: 2019-07-20
Attending: EMERGENCY MEDICINE
Payer: COMMERCIAL

## 2019-07-20 VITALS
OXYGEN SATURATION: 99 % | HEIGHT: 73 IN | DIASTOLIC BLOOD PRESSURE: 81 MMHG | WEIGHT: 279.99 LBS | SYSTOLIC BLOOD PRESSURE: 118 MMHG | HEART RATE: 77 BPM | TEMPERATURE: 98 F | RESPIRATION RATE: 18 BRPM

## 2019-07-20 VITALS
RESPIRATION RATE: 18 BRPM | OXYGEN SATURATION: 98 % | SYSTOLIC BLOOD PRESSURE: 121 MMHG | DIASTOLIC BLOOD PRESSURE: 82 MMHG | TEMPERATURE: 98 F | HEART RATE: 78 BPM

## 2019-07-20 PROCEDURE — 99284 EMERGENCY DEPT VISIT MOD MDM: CPT

## 2019-07-20 PROCEDURE — 99283 EMERGENCY DEPT VISIT LOW MDM: CPT | Mod: 25

## 2019-07-20 PROCEDURE — 73030 X-RAY EXAM OF SHOULDER: CPT

## 2019-07-20 PROCEDURE — 73030 X-RAY EXAM OF SHOULDER: CPT | Mod: 26,LT

## 2019-07-20 PROCEDURE — 93005 ELECTROCARDIOGRAM TRACING: CPT

## 2019-07-20 RX ORDER — IBUPROFEN 200 MG
1 TABLET ORAL
Qty: 96 | Refills: 0
Start: 2019-07-20 | End: 2019-08-12

## 2019-07-20 RX ORDER — IBUPROFEN 200 MG
800 TABLET ORAL ONCE
Refills: 0 | Status: COMPLETED | OUTPATIENT
Start: 2019-07-20 | End: 2019-07-20

## 2019-07-20 RX ORDER — METHOCARBAMOL 500 MG/1
750 TABLET, FILM COATED ORAL ONCE
Refills: 0 | Status: COMPLETED | OUTPATIENT
Start: 2019-07-20 | End: 2019-07-20

## 2019-07-20 RX ADMIN — METHOCARBAMOL 750 MILLIGRAM(S): 500 TABLET, FILM COATED ORAL at 18:04

## 2019-07-20 RX ADMIN — Medication 800 MILLIGRAM(S): at 18:04

## 2019-07-20 NOTE — ED PROVIDER NOTE - CLINICAL SUMMARY MEDICAL DECISION MAKING FREE TEXT BOX
50 yo male presents to the ED c/o 1 month of left shoulder pain.     ekg  left shoulder xray   ortho f/u  reassess

## 2019-07-20 NOTE — ED PROVIDER NOTE - OBJECTIVE STATEMENT
48 y/o male with PMHx of DM (formerly on metformin, stopped by physician) presents to the ED with c/o 1 month of intermittent left shoulder pain. Pt denies any numbness, weakness, or other complaints. Pt was seen by a chiropractor last week and had the area massaged. The pain has been worsening since the chiropractor visit. Pt reports first feeling this pain 1 year ago after exercising. Pt has not had an MRI for this issue in the past. Pt did not take any medication for pain.

## 2019-07-20 NOTE — ED ADULT NURSE NOTE - OBJECTIVE STATEMENT
pt is here for shoulder pain.  pt stated that left shoulder pain radiate to lower arm and neck, denied chest pain or headache, denied N/V/D, pt calm at this time.

## 2019-10-10 NOTE — ED ADULT TRIAGE NOTE - PAIN: PRESENCE, MLM
complains of pain/discomfort

## 2020-06-24 NOTE — ASU PATIENT PROFILE, ADULT - PRO INTERPRETER NEED 2
English Mercedes Flap Text: The defect edges were debeveled with a #15 scalpel blade.  Given the location of the defect, shape of the defect and the proximity to free margins a Mercedes flap was deemed most appropriate.  Using a sterile surgical marker, an appropriate advancement flap was drawn incorporating the defect and placing the expected incisions within the relaxed skin tension lines where possible. The area thus outlined was incised deep to adipose tissue with a #15 scalpel blade.  The skin margins were undermined to an appropriate distance in all directions utilizing iris scissors.

## 2021-03-02 NOTE — ASU PATIENT PROFILE, ADULT - NS TRANSFER DENTURES
Pharmacy faxed in a request for prior authorization on:    Medication: emgality  Dosage: 120 mg   Quantity requested:  1w/11 refills   Pharmacy for prescription has been selected. Prior authorization request has been initiated and sent for completion. none

## 2021-08-24 ENCOUNTER — EMERGENCY (EMERGENCY)
Facility: HOSPITAL | Age: 51
LOS: 1 days | Discharge: HOME | End: 2021-08-24
Attending: EMERGENCY MEDICINE | Admitting: EMERGENCY MEDICINE
Payer: MEDICAID

## 2021-08-24 VITALS
HEIGHT: 73 IN | OXYGEN SATURATION: 100 % | RESPIRATION RATE: 18 BRPM | HEART RATE: 88 BPM | DIASTOLIC BLOOD PRESSURE: 70 MMHG | TEMPERATURE: 98 F | SYSTOLIC BLOOD PRESSURE: 106 MMHG

## 2021-08-24 PROCEDURE — 99284 EMERGENCY DEPT VISIT MOD MDM: CPT

## 2021-08-24 RX ORDER — LIDOCAINE HYDROCHLORIDE AND EPINEPHRINE 10; 10 MG/ML; UG/ML
5 INJECTION, SOLUTION INFILTRATION; PERINEURAL ONCE
Refills: 0 | Status: DISCONTINUED | OUTPATIENT
Start: 2021-08-24 | End: 2021-08-27

## 2021-08-24 NOTE — ED PROVIDER NOTE - OBJECTIVE STATEMENT
Dr. Garduno: 52 yo male with PMH pilonidal abscess I&D, in ED with 3 days of worsening pain and swelling to low back at site of prior pilonidal abscess.  No fever or other complaints.  Pt also noting "phlegm" in throat for approx 1 month, without fever, cough, CP/SOB, N/V/D, abdominal pain or other complaints.

## 2021-08-24 NOTE — ED PROVIDER NOTE - INTERNATIONAL TRAVEL
Date of Birth: [birth year]	Time of Birth: 16:32     Admission Weight (g): 1480    Admission Date and Time:  20 @ 16:32         Gestational Age: 31.3     Source of admission [ x] Inborn     [ __ ]Transport from    [facility]: Baby is a 31.3 week GA di-di twin A male born to a 31 y/o  mother via vaginal delivery. Maternal history of beta thalassemia minor. Pregnancy complicated with admission for contractions on 3/24- s/p Mg, beta and amp, GDMA1. Presented with vaginal bleeding yesterday, and had elevation in Cr and LFTs with wnl blood pressure during the hospital course, concerning for atypical HELLP vs COVID-19. COVID testing pending at time of admission - ultimately negative. Maternal blood type O+. Prenatal labs negative/non reactive/immune GBS unknown, s/p amp x2. SROM 22h with light mec fluid. Briefly required CPAP 5 21%. Apgars 9/9.       Social History: No history of alcohol/tobacco exposure obtained  FHx: non-contributory to the condition being treated or details of FH documented here  ROS: unable to obtain ()     PHYSICAL EXAM:    General:	Awake and active;   Head:		AFOF  Eyes:		Normally set bilaterally, b/l eye discharge, sclera are clear.   Ears:		Patent bilaterally, no deformities  Nose/Mouth:	Nares patent, palate intact  Neck:		No masses, intact clavicles  Chest/Lungs:      Breath sounds equal to auscultation. No retractions, pectus  CV:		No murmurs appreciated, normal pulses bilaterally  Abdomen:          Soft nontender nondistended, no masses, bowel sounds present  :		Normal for gestational age  Back:		Intact skin, no sacral dimples or tags  Anus:		Grossly patent, diaper rash  Extremities:	FROM, no hip clicks  Skin:		Pink, no lesions  Neuro exam:	Appropriate tone, activity    **************************************************************************************************  Age:24d    LOS:24d    Vital Signs:  T(C): 36.9 ( @ 05:00), Max: 37.2 ( @ 08:00)  HR: 150 ( @ 05:00) (146 - 179)  BP: 79/53 ( @ 20:00) (73/30 - 79/53)  RR: 24 ( @ 05:00) (24 - 48)  SpO2: 100% ( @ 05:00) (94% - 100%)    ferrous sulfate Oral Liquid - Peds 3.9 milliGRAM(s) Elemental Iron daily  hepatitis B IntraMuscular Vaccine - Peds 0.5 milliLiter(s) once  multivitamin Oral Drops - Peds 1 milliLiter(s) daily      LABS:         Blood type, Baby [] ABO: B  Rh; Positive DC; Negative                              0   0 )-----------( 0             [ @ 02:50]                  42.5  S 0%  B 0%  Alva 0%  Myelo 0%  Promyelo 0%  Blasts 0%  Lymph 0%  Mono 0%  Eos 0%  Baso 0%  Retic 1.5%                        19.8   8.59 )-----------( 287             [ @ 19:15]                  56.6  S 41.0%  B 0%  Alva 0%  Myelo 0%  Promyelo 0%  Blasts 0%  Lymph 42.0%  Mono 15.0%  Eos 2.0%  Baso 0%  Retic 0%        137  |102  | 13     ------------------<79   Ca 11.4 Mg 2.8  Ph 7.0   [ @ 02:45]  6.0   | 24   | 0.38        134  |97   | 8      ------------------<64   Ca 11.3 Mg 2.2  Ph 5.8   [ @ 02:30]  5.7   | 26   | 0.48                   Alkaline Phosphatase []  364, Alkaline Phosphatase []  424  Albumin [] 2.9, Albumin [] 3.2  []    AST 42, ALT < 4, GGT  N/A    POCT Glucose:                                       **************************************************************************************************		  DISCHARGE PLANNING (date and status):  Hep B Vacc:  CCHD:			  :					  Hearing:    screen:     passed   Circumcision:           no   Hip US rec:  	  Synagis: 			  Other Immunizations (with dates):    		  Neurodevelop eval - NRE 5, no EI, f/u 6 months.   CPR class done?  	  PVS at DC?  Vit D at DC?	  FE at DC?	    PMD:          Name:  ______________ _             Contact information:  ______________ _  Pharmacy: Name:  ______________ _              Contact information:  ______________ _    Follow-up appointments (list):      Time spent on the total subsequent encounter with >50% of the visit spent on counseling and/or coordination of care:[ _ ] 15 min[ _ ] 25 min[ _ ] 35 min  [ _ ] Discharge time spent >30 min   [ __ ] Car seat oximetry reviewed. No

## 2021-08-24 NOTE — ED PROVIDER NOTE - CLINICAL SUMMARY MEDICAL DECISION MAKING FREE TEXT BOX
pt with pilonidal cyst, does not appear to be abscess at this time (no warmth, redness, cellulitis or other findings); discussed with pt that we can either attempt I&D or refer to general surgery for more definitive treatment--pt elects to have I&D; throat "phlegm" with normal findings, no fever or lung findings, no historical findings to suggest bacterial infection or even viral infection at this time; discussed with pt OTC options (i.e. Mucinex or Sudafed) and he understands and will follow up

## 2021-08-24 NOTE — ED PROVIDER NOTE - PATIENT PORTAL LINK FT
You can access the FollowMyHealth Patient Portal offered by [facility] by registering at the following website: [email or web address]. By joining [facility]’s FollowMyHealth portal, you will also be able to view your health information using other applications (apps) compatible with our system.

## 2021-08-24 NOTE — ED PROVIDER NOTE - PHYSICAL EXAMINATION
low back: healed pilonidal abscess site noted; overlying area with mild fluctuance but no erythema, warmth, discharge or cellulitis

## 2021-08-24 NOTE — ED PROVIDER NOTE - PROGRESS NOTE DETAILS
Dr. Garduno: I&D performed but only drained blood, no pus; discussed with pt that he needs to keep incision covered until tomorrow, do not get wet today, tomorrow when he showers can remove gauze and then after dry and cover with antibiotic ointment and band aid; will give surgery follow up for definitive treatment

## 2021-08-24 NOTE — ED PROVIDER NOTE - NSFOLLOWUPINSTRUCTIONS_ED_ALL_ED_FT
1. KEEP WOUND CLEAN AND DRY UNTIL TOMORROW (8/25).  2. WHEN YOU SHOWER TOMORROW, DRY AREA WELL AFTER SHOWERING.  THEN COVER WITH ANTIBIOTIC OINTMENT AND A BAND AID.  PUT ANTIBIOTIC OINTMENT ON TWICE A DAY AND KEEP WOUND COVERED.  3. FOLLOW UP WITH GENERAL SURGERY.  YOU WERE GIVEN A REFERRAL LIST--CALL YOU LISTED DOCTOR TO MAKE AN APPOINTMENT.  OR, MAKE AN APPOINTMENT WITH THE SURGICAL CLINIC -[phone].  4. FOR PAIN YOU CAN TAKE OVER THE COUNTER IBUPROFEN, MOTRIN, ACETAMINOPHEN OR TYLENOL AS DIRECTED ON PACKAGING.  5. RETURN TO THE ER FOR ANY WORSENING SYMPTOMS OR CONCERNS. 1. KEEP WOUND CLEAN AND DRY UNTIL TOMORROW (8/25).  2. WHEN YOU SHOWER TOMORROW, DRY AREA WELL AFTER SHOWERING.  THEN COVER WITH ANTIBIOTIC OINTMENT AND A BAND AID.  PUT ANTIBIOTIC OINTMENT ON TWICE A DAY AND KEEP WOUND COVERED.  3. FOLLOW UP WITH GENERAL SURGERY.  YOU WERE GIVEN A REFERRAL LIST--CALL YOU LISTED DOCTOR TO MAKE AN APPOINTMENT.  OR, MAKE AN APPOINTMENT WITH THE SURGICAL CLINIC -[phone].  4. FOR PAIN YOU CAN TAKE OVER THE COUNTER IBUPROFEN, MOTRIN, ACETAMINOPHEN OR TYLENOL AS DIRECTED ON PACKAGING.  5. FOR YOUR PHLEGM YOU CAN TAKE OVER THE COUNTER MUCINEX OR SUDAFED, AS DIRECTED ON PACKAGING.  6. RETURN TO THE ER FOR ANY WORSENING SYMPTOMS OR CONCERNS.

## 2021-08-24 NOTE — ED PROCEDURE NOTE - ATTENDING CONTRIBUTION TO CARE
Dr. Garduno: I personally supervised this procedure performed by the resident and I agree with the above documentation.

## 2021-08-24 NOTE — ED PROVIDER NOTE - NSICDXPASTMEDICALHX_GEN_ALL_CORE_FT
13-Aug-2021 07:17 PAST MEDICAL HISTORY:  Other intervertebral disc displacement, lumbar region     Pituitary tumor dx in 2011    Sciatica     Seizure last one at age 14, no longer under care of neurologist

## 2021-11-30 NOTE — PHYSICAL THERAPY INITIAL EVALUATION ADULT - PRECAUTIONS/LIMITATIONS, REHAB EVAL
Preoperative Diagnosis:Missed , Retained products of conception  Postoperative Diagnosis: Same as above   Procedure:    1. Suction Dilation and Curettage  Surgeon: Dr. Chapman   Assistant: Alan Yee MD PGY1  Anesthesia: General, Anesthesiologist: Aislinn Whitlock MD  Estimated Blood Loss: 50 cc  Blood Administered: None  Fluids: per anesthesia record  Complications:  None  Grafts/Implants: None  Assistant Tasks: dilating, removing tissue    Operative findings: Products of conception removed.    Specimen: Products of conception    Operative Procedure  The patient was taken to the operating room with IV fluids running and general anesthesia was given.  She was then placed on the operating table in the dorsal lithotomy position in Young stirrups and prepped and draped in the usual sterile fashion for vaginal surgery.  A timeout was performed and all parties were in agreement. A weighted speculum was then placed in the posterior fornix and the right angle retractor was used anteriorly to visualize the cervix.  The anterior lip of the cervix was then grasped with a single-tooth tenaculum.  Next, Hegar dilators were used to dilate the cervix until it permitted entry of a number 6 suction curette.  Suction curettage was carried out in the usual fashion until all contents had been evacuated. Curettings were sent to pathology.  All instruments were removed from the vagina.  The tenaculum site was inspected and good hemostasis was noted.  The patient tolerated the procedure well.  Sponge, lap, and instrument counts were correct.  The patient was extubated, awakened and taken to the recovery room in stable condition.    Attending: Dr. Chapman   Resident: Alan Yee MD PGY1       
spinal precautions/surgical precautions

## 2022-01-19 ENCOUNTER — EMERGENCY (EMERGENCY)
Facility: HOSPITAL | Age: 52
LOS: 1 days | Discharge: HOME | End: 2022-01-19
Attending: STUDENT IN AN ORGANIZED HEALTH CARE EDUCATION/TRAINING PROGRAM | Admitting: STUDENT IN AN ORGANIZED HEALTH CARE EDUCATION/TRAINING PROGRAM
Payer: MEDICAID

## 2022-01-19 VITALS
DIASTOLIC BLOOD PRESSURE: 75 MMHG | RESPIRATION RATE: 18 BRPM | OXYGEN SATURATION: 96 % | SYSTOLIC BLOOD PRESSURE: 106 MMHG | HEIGHT: 73 IN | HEART RATE: 71 BPM | TEMPERATURE: 98 F

## 2022-01-19 PROCEDURE — 71101 X-RAY EXAM UNILAT RIBS/CHEST: CPT | Mod: 26,LT

## 2022-01-19 PROCEDURE — 99284 EMERGENCY DEPT VISIT MOD MDM: CPT

## 2022-01-19 RX ORDER — IBUPROFEN 200 MG
600 TABLET ORAL ONCE
Refills: 0 | Status: COMPLETED | OUTPATIENT
Start: 2022-01-19 | End: 2022-01-19

## 2022-01-19 RX ORDER — LIDOCAINE 4 G/100G
1 CREAM TOPICAL ONCE
Refills: 0 | Status: COMPLETED | OUTPATIENT
Start: 2022-01-19 | End: 2022-01-19

## 2022-01-19 RX ORDER — IBUPROFEN 200 MG
1 TABLET ORAL
Qty: 20 | Refills: 0
Start: 2022-01-19

## 2022-01-19 RX ORDER — LIDOCAINE 4 G/100G
1 CREAM TOPICAL
Qty: 6 | Refills: 0
Start: 2022-01-19

## 2022-01-19 RX ORDER — OXYCODONE AND ACETAMINOPHEN 5; 325 MG/1; MG/1
1 TABLET ORAL ONCE
Refills: 0 | Status: DISCONTINUED | OUTPATIENT
Start: 2022-01-19 | End: 2022-01-19

## 2022-01-19 RX ADMIN — OXYCODONE AND ACETAMINOPHEN 1 TABLET(S): 5; 325 TABLET ORAL at 12:36

## 2022-01-19 RX ADMIN — Medication 600 MILLIGRAM(S): at 12:36

## 2022-01-19 RX ADMIN — LIDOCAINE 1 PATCH: 4 CREAM TOPICAL at 12:35

## 2022-01-19 NOTE — ED PROVIDER NOTE - PATIENT PORTAL LINK FT
You can access the FollowMyHealth Patient Portal offered by [facility] by registering at the following website: [email or web address]. By joining [facility]’s FollowMyHealth portal, you will also be able to view your health information using other applications (apps) compatible with our system.

## 2022-01-19 NOTE — ED PROVIDER NOTE - NSFOLLOWUPINSTRUCTIONS_ED_ALL_ED_FT
A rib contusion is a bruise on one or more of your ribs.     Rib Cage     DISCHARGE INSTRUCTIONS:    Return to the emergency department if:   •You have increased chest pain.     •You have shortness of breath.     •You start to cough up blood.    •Your pain does not improve with pain medicine.    Contact your healthcare provider if:   •You have a cough.    •You have a fever.     •You have questions or concerns about your condition or care.     Medicines: You may need any of the following:   •NSAIDs, such as ibuprofen, help decrease swelling, pain, and fever. This medicine is available with or without a doctor's order. NSAIDs can cause stomach bleeding or kidney problems in certain people. If you take blood thinner medicine, always ask if NSAIDs are safe for you. Always read the medicine label and follow directions. Do not give these medicines to children under 6 months of age without direction from your child's healthcare provider.    •Prescription pain medicine may be given. Ask how to take this medicine safely.    •Take your medicine as directed. Contact your healthcare provider if you think your medicine is not helping or if you have side effects. Tell him of her if you are allergic to any medicine. Keep a list of the medicines, vitamins, and herbs you take. Include the amounts, and when and why you take them. Bring the list or the pill bottles to follow-up visits. Carry your medicine list with you in case of an emergency.    Deep breathing:   •To help prevent pneumonia, take 10 deep breaths every hour, even when you wake up during the night. Brace your ribs with your hands or a pillow while you take deep breaths or cough. This will help decrease your pain.    •You may need to use an incentive spirometer to help you take deeper breaths. Put the plastic piece into your mouth and take a very deep breath. Hold your breath as long as you can. Then let out your breath. Do this 10 times in a row every hour while you are awake.    Rest: Rest your ribs to decrease swelling and allow the injury to heal faster. Avoid activities that may cause more pain or damage to your ribs. As your pain decreases, begin movements slowly.    Ice: Ice helps decrease swelling and pain. Ice may also help prevent tissue damage. Use an ice pack or put crushed ice in a plastic bag. Cover it with a towel and place it on your bruised area for 15 to 20 minutes every hour as directed.    Follow up with your doctor as directed: Write down your questions so you remember to ask them during your visits.

## 2022-01-19 NOTE — ED PROVIDER NOTE - CLINICAL SUMMARY MEDICAL DECISION MAKING FREE TEXT BOX
52 y/o M presenting w/ rib contusion x1 wk s/p slip and fall. Will obtain X-ray to r/o fx and provide analgesics.

## 2022-01-19 NOTE — ED PROVIDER NOTE - OBJECTIVE STATEMENT
50 y/o M w/ no PMHx presents c/o L sided rib pain x1 wk. Pt reports slipping on ice and falling onto his L side. Pt did not seek medical attention at time of injury and did not take analgesics as pt thought pain would resolve, but did not, which prompted the ED visit. Pt endorses pain w/ deep inspiration or movement. Denies any other acute complaints or extremity pain.

## 2022-01-19 NOTE — ED PROVIDER NOTE - NSICDXPASTMEDICALHX_GEN_ALL_CORE_FT
PAST MEDICAL HISTORY:  Other intervertebral disc displacement, lumbar region     Pituitary tumor dx in 2011    Sciatica     Seizure last one at age 14, no longer under care of neurologist

## 2022-01-19 NOTE — ED PROVIDER NOTE - ATTENDING CONTRIBUTION TO CARE
50 y/o M w/ no PMHx presents c/o L sided rib pain x1 wk. Pt reports slipping on ice and falling onto his L side. Pt did not seek medical attention at time of injury and did not take analgesics as pt thought pain would resolve, but did not, which prompted the ED visit. Pt endorses pain w/ deep inspiration or movement. Denies any other acute complaints or extremity pain. no cough, no fevers  on exam left anterior lower rib tenderness, lungs clear  will check xr, pain control, dc

## 2022-03-16 ENCOUNTER — EMERGENCY (EMERGENCY)
Facility: HOSPITAL | Age: 52
LOS: 1 days | Discharge: HOME | End: 2022-03-16
Attending: EMERGENCY MEDICINE | Admitting: EMERGENCY MEDICINE
Payer: MEDICAID

## 2022-03-16 VITALS
SYSTOLIC BLOOD PRESSURE: 114 MMHG | HEIGHT: 73 IN | DIASTOLIC BLOOD PRESSURE: 58 MMHG | RESPIRATION RATE: 16 BRPM | TEMPERATURE: 97 F | OXYGEN SATURATION: 100 % | HEART RATE: 76 BPM

## 2022-03-16 PROCEDURE — 10080 I&D PILONIDAL CYST SIMPLE: CPT

## 2022-03-16 PROCEDURE — 99283 EMERGENCY DEPT VISIT LOW MDM: CPT | Mod: 25

## 2022-03-16 RX ORDER — CEPHALEXIN 500 MG
1 CAPSULE ORAL
Qty: 14 | Refills: 0
Start: 2022-03-16 | End: 2022-03-22

## 2022-03-16 NOTE — ED PROVIDER NOTE - PHYSICAL EXAMINATION
Gen: Well appearing in NAD  Head: NC/AT  Neck: trachea midline  Resp:  No distress  Ext: no deformities  Neuro:  A&O appears non focal  Skin:  Warm and dry as visualized  Psych:  Normal affect and mood     skin: sacral area: + 1cm x 2cm area of fluctuance at gluteal cleft. mild surroudning iduration. no erythema. + ttp. no bleeding or drainage.

## 2022-03-16 NOTE — ED PROVIDER NOTE - ATTENDING CONTRIBUTION TO CARE
I performed a face-to-face evaluation of the patient and performed a history and physical examination. I agree with the history and physical examination. If this was a PA visit, I personally saw the patient with the PA and performed a substantive portion of the visit including all aspects of the medical decision making.  Patient examined, well appearing NAD HEENT nml heart s1s2, lungs clear 3cm pilonidal  at midline. some induration, minimal erythema.

## 2022-03-16 NOTE — ED PROVIDER NOTE - NS ED ATTENDING STATEMENT MOD
This was a shared visit with the IN[facility]. I reviewed and verified the documentation and independently performed the documented:

## 2022-03-16 NOTE — ED PROVIDER NOTE - PATIENT PORTAL LINK FT
You can access the FollowMyHealth Patient Portal offered by [facility] by registering at the following website: [email or web address]. By joining [employer]’s FollowMyHealth portal, you will also be able to view your health information using other applications (apps) compatible with our system.

## 2022-03-16 NOTE — ED PROVIDER NOTE - OBJECTIVE STATEMENT
53 y/o male hx pilonidal cysts presents to ER c/o sacral pain and swelling. Pt. states for the past 2 days has been experiencing pain to sacral area and swelling. Pt states is similar to previous pilonidal cysts./aabscesses which have had to br drained in the past. Pt. states has never followed up with gen surgery for this only comes to the ER. Deneis fever chills bleeding drainage weakness lethargy.

## 2022-03-16 NOTE — ED PROVIDER NOTE - NSFOLLOWUPINSTRUCTIONS_ED_ALL_ED_FT
Pilonidal Cyst    WHAT YOU NEED TO KNOW:    A pilonidal cyst is a small sac under the skin. Pilonidal cysts may become infected and cause an abscess (collection of pus).    DISCHARGE INSTRUCTIONS:    Contact your healthcare provider if:    You have a fever.    Your cyst is red and swollen.    Your cyst has pus draining from it.    You have questions or concerns about your condition or care.  Prevent an infection:    Shave around the cyst. This will prevent hairs from entering the cyst. Your healthcare provider may recommend laser hair removal.    Clean the cyst area as directed. Your healthcare provider may recommend that you use a mild soap and rinse it well.    Do not sit for long periods of time. This may cause the cyst to get irritated.  Follow up with your healthcare provider as directed: Write down your questions so you remember to ask them during your visits.    Quiste pilonidal    LO QUE NECESITA SABER:    Un quiste pilonidal es un pequeño saco debajo de la piel. Los quistes pilonidales se pueden infectar y causar abscesos (acumulación de pus).    [facility]STRUCCIONES [facility]BRE [facility]:    Comuníquese con paul médico si:    Tiene fiebre.    Paul quiste está enrojecido e hinchado.    El quiste supura pus.    Usted tiene preguntas o inquietudes acerca de paul condición o cuidado.  Prevenga brisa infección:    Afeite el área alrededor del quiste.Cricket evitará que los pelos entren al quiste. Paul médico podría recomendarle la depilación láser.    Limpie el quiste según las indicaciones.Paul médico podría recomendarle que use un jabón suave y que lo enjuague phillip.    No se siente por largos periodos de tiempo.Cricket puede provocar la irritación del quiste.  Acuda a lily consultas de control con paul médico según le indicaron.Anote lily preguntas para que se acuerde de hacerlas manolo lily visitas.

## 2022-03-16 NOTE — ED PROVIDER NOTE - CLINICAL SUMMARY MEDICAL DECISION MAKING FREE TEXT BOX
51 y/o male c/o sacral pain and swelling  -laila pilnidal abscess  -I&D  -abx  -general surgery follow up

## 2022-03-30 ENCOUNTER — APPOINTMENT (OUTPATIENT)
Dept: SURGERY | Facility: CLINIC | Age: 52
End: 2022-03-30
Payer: MEDICAID

## 2022-03-30 VITALS
WEIGHT: 249.06 LBS | BODY MASS INDEX: 33.01 KG/M2 | SYSTOLIC BLOOD PRESSURE: 109 MMHG | OXYGEN SATURATION: 97 % | TEMPERATURE: 97.9 F | DIASTOLIC BLOOD PRESSURE: 73 MMHG | HEART RATE: 62 BPM | HEIGHT: 73 IN

## 2022-03-30 DIAGNOSIS — L05.91 PILONIDAL CYST W/OUT ABSCESS: ICD-10-CM

## 2022-03-30 PROCEDURE — 99202 OFFICE O/P NEW SF 15 MIN: CPT

## 2022-03-30 NOTE — HISTORY OF PRESENT ILLNESS
[de-identified] : 53 yo male with h/o multiple I&D for pilonidal abscess throughout  his life presents today for consult regarding definitive surgery. He states he never had  surgery in the past because after I&D he would sometimes be fine for several months, and he did not want to miss work. He currently feels fine recently had a small flare which drained spontaneously.

## 2022-03-30 NOTE — PHYSICAL EXAM
[Respiratory Effort] : normal respiratory effort [Alert] : alert [Oriented to Person] : oriented to person [Oriented to Place] : oriented to place [Oriented to Time] : oriented to time [Calm] : calm [JVD] : no jugular venous distention  [de-identified] : WICHO LEON NAD [de-identified] : EOMI [de-identified] : + sinuses in  cleft with scab noted superiorly.

## 2022-04-13 ENCOUNTER — OUTPATIENT (OUTPATIENT)
Dept: OUTPATIENT SERVICES | Facility: HOSPITAL | Age: 52
LOS: 1 days | Discharge: HOME | End: 2022-04-13

## 2022-04-13 VITALS
HEIGHT: 73 IN | OXYGEN SATURATION: 99 % | SYSTOLIC BLOOD PRESSURE: 108 MMHG | TEMPERATURE: 98 F | DIASTOLIC BLOOD PRESSURE: 74 MMHG | RESPIRATION RATE: 18 BRPM | WEIGHT: 246.7 LBS | HEART RATE: 71 BPM

## 2022-04-13 DIAGNOSIS — Z01.818 ENCOUNTER FOR OTHER PREPROCEDURAL EXAMINATION: ICD-10-CM

## 2022-04-13 DIAGNOSIS — L05.91 PILONIDAL CYST WITHOUT ABSCESS: ICD-10-CM

## 2022-04-13 DIAGNOSIS — F17.200 NICOTINE DEPENDENCE, UNSPECIFIED, UNCOMPLICATED: ICD-10-CM

## 2022-04-13 LAB
ANION GAP SERPL CALC-SCNC: 5 MMOL/L — SIGNIFICANT CHANGE UP (ref 5–17)
BASOPHILS # BLD AUTO: 0.09 K/UL — SIGNIFICANT CHANGE UP (ref 0–0.2)
BASOPHILS NFR BLD AUTO: 1.3 % — SIGNIFICANT CHANGE UP (ref 0–2)
BUN SERPL-MCNC: 13 MG/DL — SIGNIFICANT CHANGE UP (ref 7–23)
CALCIUM SERPL-MCNC: 9.6 MG/DL — SIGNIFICANT CHANGE UP (ref 8.5–10.1)
CHLORIDE SERPL-SCNC: 110 MMOL/L — HIGH (ref 96–108)
CO2 SERPL-SCNC: 28 MMOL/L — SIGNIFICANT CHANGE UP (ref 22–31)
CREAT SERPL-MCNC: 0.79 MG/DL — SIGNIFICANT CHANGE UP (ref 0.5–1.3)
EGFR: 108 ML/MIN/1.73M2 — SIGNIFICANT CHANGE UP
EOSINOPHIL # BLD AUTO: 0.19 K/UL — SIGNIFICANT CHANGE UP (ref 0–0.5)
EOSINOPHIL NFR BLD AUTO: 2.8 % — SIGNIFICANT CHANGE UP (ref 0–6)
GLUCOSE SERPL-MCNC: 80 MG/DL — SIGNIFICANT CHANGE UP (ref 70–99)
HCT VFR BLD CALC: 45.1 % — SIGNIFICANT CHANGE UP (ref 39–50)
HGB BLD-MCNC: 14.8 G/DL — SIGNIFICANT CHANGE UP (ref 13–17)
IMM GRANULOCYTES NFR BLD AUTO: 0.4 % — SIGNIFICANT CHANGE UP (ref 0–1.5)
LYMPHOCYTES # BLD AUTO: 1.92 K/UL — SIGNIFICANT CHANGE UP (ref 1–3.3)
LYMPHOCYTES # BLD AUTO: 28.7 % — SIGNIFICANT CHANGE UP (ref 13–44)
MCHC RBC-ENTMCNC: 27 PG — SIGNIFICANT CHANGE UP (ref 27–34)
MCHC RBC-ENTMCNC: 32.8 G/DL — SIGNIFICANT CHANGE UP (ref 32–36)
MCV RBC AUTO: 82.3 FL — SIGNIFICANT CHANGE UP (ref 80–100)
MONOCYTES # BLD AUTO: 0.41 K/UL — SIGNIFICANT CHANGE UP (ref 0–0.9)
MONOCYTES NFR BLD AUTO: 6.1 % — SIGNIFICANT CHANGE UP (ref 2–14)
NEUTROPHILS # BLD AUTO: 4.05 K/UL — SIGNIFICANT CHANGE UP (ref 1.8–7.4)
NEUTROPHILS NFR BLD AUTO: 60.7 % — SIGNIFICANT CHANGE UP (ref 43–77)
NRBC # BLD: 0 /100 WBCS — SIGNIFICANT CHANGE UP (ref 0–0)
PLATELET # BLD AUTO: 321 K/UL — SIGNIFICANT CHANGE UP (ref 150–400)
POTASSIUM SERPL-MCNC: 4.3 MMOL/L — SIGNIFICANT CHANGE UP (ref 3.5–5.3)
POTASSIUM SERPL-SCNC: 4.3 MMOL/L — SIGNIFICANT CHANGE UP (ref 3.5–5.3)
RBC # BLD: 5.48 M/UL — SIGNIFICANT CHANGE UP (ref 4.2–5.8)
RBC # FLD: 13.6 % — SIGNIFICANT CHANGE UP (ref 10.3–14.5)
SODIUM SERPL-SCNC: 143 MMOL/L — SIGNIFICANT CHANGE UP (ref 135–145)
WBC # BLD: 6.69 K/UL — SIGNIFICANT CHANGE UP (ref 3.8–10.5)
WBC # FLD AUTO: 6.69 K/UL — SIGNIFICANT CHANGE UP (ref 3.8–10.5)

## 2022-04-13 RX ORDER — GABAPENTIN 400 MG/1
1 CAPSULE ORAL
Qty: 0 | Refills: 0 | DISCHARGE

## 2022-04-13 NOTE — H&P PST ADULT - FALL HARM RISK - UNIVERSAL INTERVENTIONS
Bed in lowest position, wheels locked, appropriate side rails in place/Call bell, personal items and telephone in reach/Instruct patient to call for assistance before getting out of bed or chair/Non-slip footwear when patient is out of bed/[place] to call system/Physically safe environment - no spills, clutter or unnecessary equipment/Purposeful Proactive Rounding/Room/bathroom lighting operational, light cord in reach

## 2022-04-13 NOTE — H&P PST ADULT - HISTORY OF PRESENT ILLNESS
53yo male current everyday with medical h/o Benign Pituitary tumor, dx 2011 taking Cabergoline 0.5mg twice weekly. Pt reports recurrent Pilonidal cyst and presents today for PST for scheduled Excision Pilonidal Cyst on 4/19/2022 51yo male current everyday smoker with medical h/o Benign Pituitary tumor, dx 2011 taking Cabergoline 0.5mg twice weekly. Pt reports recurrent Pilonidal cyst and presents today for PST for scheduled Excision Pilonidal Cyst on 4/19/2022

## 2022-04-13 NOTE — H&P PST ADULT - NSICDXPASTMEDICALHX_GEN_ALL_CORE_FT
PAST MEDICAL HISTORY:  Cigarette smoker     Other intervertebral disc displacement, lumbar region     Pilonidal cyst     Pituitary tumor dx in 2011    Sciatica     Seizure last one at age 14, no longer under care of neurologist

## 2022-04-18 ENCOUNTER — TRANSCRIPTION ENCOUNTER (OUTPATIENT)
Age: 52
End: 2022-04-18

## 2022-04-19 ENCOUNTER — OUTPATIENT (OUTPATIENT)
Dept: OUTPATIENT SERVICES | Facility: HOSPITAL | Age: 52
LOS: 1 days | Discharge: HOME | End: 2022-04-19
Payer: MEDICAID

## 2022-04-19 ENCOUNTER — RESULT REVIEW (OUTPATIENT)
Age: 52
End: 2022-04-19

## 2022-04-19 ENCOUNTER — APPOINTMENT (OUTPATIENT)
Dept: SURGERY | Facility: HOSPITAL | Age: 52
End: 2022-04-19

## 2022-04-19 ENCOUNTER — TRANSCRIPTION ENCOUNTER (OUTPATIENT)
Age: 52
End: 2022-04-19

## 2022-04-19 VITALS
OXYGEN SATURATION: 100 % | WEIGHT: 240.08 LBS | SYSTOLIC BLOOD PRESSURE: 120 MMHG | RESPIRATION RATE: 14 BRPM | TEMPERATURE: 98 F | HEART RATE: 53 BPM | DIASTOLIC BLOOD PRESSURE: 80 MMHG | HEIGHT: 73 IN

## 2022-04-19 VITALS
OXYGEN SATURATION: 100 % | DIASTOLIC BLOOD PRESSURE: 69 MMHG | RESPIRATION RATE: 14 BRPM | SYSTOLIC BLOOD PRESSURE: 107 MMHG | HEART RATE: 52 BPM

## 2022-04-19 PROCEDURE — 88304 TISSUE EXAM BY PATHOLOGIST: CPT | Mod: 26

## 2022-04-19 PROCEDURE — 11771 EXC PILONIDAL CYST XTNSV: CPT

## 2022-04-19 RX ORDER — SODIUM CHLORIDE 9 MG/ML
1000 INJECTION, SOLUTION INTRAVENOUS
Refills: 0 | Status: DISCONTINUED | OUTPATIENT
Start: 2022-04-19 | End: 2022-04-19

## 2022-04-19 RX ORDER — CABERGOLINE 0.5 MG/1
1 TABLET ORAL
Qty: 0 | Refills: 0 | DISCHARGE

## 2022-04-19 RX ORDER — FENTANYL CITRATE 50 UG/ML
25 INJECTION INTRAVENOUS
Refills: 0 | Status: DISCONTINUED | OUTPATIENT
Start: 2022-04-19 | End: 2022-04-19

## 2022-04-19 RX ORDER — SODIUM CHLORIDE 9 MG/ML
1000 INJECTION INTRAMUSCULAR; INTRAVENOUS; SUBCUTANEOUS
Refills: 0 | Status: DISCONTINUED | OUTPATIENT
Start: 2022-04-19 | End: 2022-04-19

## 2022-04-19 RX ORDER — ACETAMINOPHEN 500 MG
1000 TABLET ORAL ONCE
Refills: 0 | Status: COMPLETED | OUTPATIENT
Start: 2022-04-19 | End: 2022-04-19

## 2022-04-19 RX ADMIN — SODIUM CHLORIDE 75 MILLILITER(S): 9 INJECTION, SOLUTION INTRAVENOUS at 14:18

## 2022-04-19 RX ADMIN — Medication 400 MILLIGRAM(S): at 14:18

## 2022-04-19 NOTE — ASU DISCHARGE PLAN (ADULT/PEDIATRIC) - ASU DC SPECIAL INSTRUCTIONSFT
ice packs on-off for 24hrs    f/u 10 days with Dr. Horn    when dressing is removed in 48 hrs shower and then dress with bacitracin and dry 4x4

## 2022-04-19 NOTE — ASU PATIENT PROFILE, ADULT - FALL HARM RISK - HARM RISK INTERVENTIONS

## 2022-04-19 NOTE — ASU DISCHARGE PLAN (ADULT/PEDIATRIC) - NS MD DC FALL RISK RISK
For information on Fall & Injury Prevention, visit: [email or web address] OR  [email or web address] OR  https://www.cdc.gov/steadi/patient.html

## 2022-04-19 NOTE — ASU PREOP CHECKLIST - IDENTIFICATION BAND VERIFIED
"Owen Solomon's goals for this visit include:  3 month follow up/Diabetes  He requests these members of his care team be copied on today's visit information: YES    PCP: Liberty King    Referring Provider:  No referring provider defined for this encounter.    Ht 1.856 m (6' 1.07\")   Wt 116.1 kg (256 lb)   BMI 33.71 kg/m      Do you need any medication refills at today's visit? No    Lis Martinez CMA  " done

## 2022-04-20 LAB — SURGICAL PATHOLOGY STUDY: SIGNIFICANT CHANGE UP

## 2022-04-22 DIAGNOSIS — L05.92 PILONIDAL SINUS WITHOUT ABSCESS: ICD-10-CM

## 2022-04-22 DIAGNOSIS — R56.9 UNSPECIFIED CONVULSIONS: ICD-10-CM

## 2022-04-22 DIAGNOSIS — F17.210 NICOTINE DEPENDENCE, CIGARETTES, UNCOMPLICATED: ICD-10-CM

## 2022-04-22 DIAGNOSIS — L05.91 PILONIDAL CYST WITHOUT ABSCESS: ICD-10-CM

## 2022-08-08 NOTE — ASU PATIENT PROFILE, ADULT - NS TRANSFER PATIENT BELONGINGS
[facility]    Brief Operative Note    Pre-operative diagnosis: Bone lesion [M89.9]  Post-operative diagnosis Same as pre-operative diagnosis    Procedure: Procedure(s):  Biopsy Left Tibia  With Curettage and Allograft Placement  Surgeon: Surgeon(s) and Role:     * Tee Espinoza MD - Primary     * Cheryl Ayala PA-C - Assisting  Anesthesia: General   Estimated Blood Loss: Less than 50 ml    Drains: None  Specimens:   ID Type Source Tests Collected by Time Destination   1 : LEFT TIBIA BIOPSY Biopsy Tibia, Left SURGICAL PATHOLOGY EXAM Tee Espinoza MD 8/8/2022 12:01 PM      Findings:   cyst in prox tibia.  Complications: None.  Implants:   Implant Name Type Inv. Item Serial No.  Lot No. LRB No. Used Action   CEMENT BONE NJCTBL HA SUBSTITUE [facility] [id number] - [id number] Cement, Bone CEMENT BONE NJCTBL HA SUBSTITUE [facility] [id number]  [employer] [id number] Left 1 Implanted   GRAFT [facility] 1-10MM [id number] - [id number] Bone/Tissue/Biologic GRAFT [facility] 1-10MM [id number] [employer]  Left 1 Implanted     Post-op Plan: Mepilex dressing x 5 days, then cover as needed  WB status:  FWB, walker or cane prn  Device:  none  DVT Prophylaxis:  Not needed   Follow-up:  2 weeks with Dr. Reyes/SHEREEN for wound check           Cell Phone/PDA (specify)/Clothing

## 2023-03-05 ENCOUNTER — EMERGENCY (EMERGENCY)
Facility: HOSPITAL | Age: 53
LOS: 1 days | Discharge: HOME | End: 2023-03-05
Attending: STUDENT IN AN ORGANIZED HEALTH CARE EDUCATION/TRAINING PROGRAM | Admitting: STUDENT IN AN ORGANIZED HEALTH CARE EDUCATION/TRAINING PROGRAM
Payer: MEDICAID

## 2023-03-05 VITALS
DIASTOLIC BLOOD PRESSURE: 95 MMHG | RESPIRATION RATE: 18 BRPM | TEMPERATURE: 99 F | OXYGEN SATURATION: 99 % | SYSTOLIC BLOOD PRESSURE: 146 MMHG | HEART RATE: 95 BPM

## 2023-03-05 PROBLEM — F17.210 NICOTINE DEPENDENCE, CIGARETTES, UNCOMPLICATED: Chronic | Status: ACTIVE | Noted: 2022-04-13

## 2023-03-05 PROBLEM — L05.91 PILONIDAL CYST WITHOUT ABSCESS: Chronic | Status: ACTIVE | Noted: 2022-04-13

## 2023-03-05 PROCEDURE — 99283 EMERGENCY DEPT VISIT LOW MDM: CPT | Mod: 25

## 2023-03-05 PROCEDURE — 10060 I&D ABSCESS SIMPLE/SINGLE: CPT

## 2023-03-05 NOTE — ED PROVIDER NOTE - OBJECTIVE STATEMENT
53 year old male with hx of recurrent abscesses comes into the ED for evaluation of abscess on the posterior left thigh that he noticed 3 days ago. Over the past 3 days the abscess has grown and Is now tender with palpation. He has not had any recent fevers, chills, the abscess has not been draining, and there is no pain around the abscess. He was last seen a few months ago for a pilodenal cyst that was removed. He has not used any abx over the past several months. He denies any abd pain, n/v, urinary symptoms, pain with defecation, blood in the stool. He denies any hx of DM and does not take any medications for anything.    He also reports he never got the sutures removed from months ago when he had a pilonidal cyst removed.

## 2023-03-05 NOTE — ED PROVIDER NOTE - NSFOLLOWUPINSTRUCTIONS_ED_ALL_ED_FT
Please return to the ED in 3 days for reevaluation of the incised and drained abscess. Please keep the area clean.    Abscess    An abscess is an infected area that contains a collection of pus and debris. It can occur in almost any part of the body and occurs when the tissue gets infection. Symptoms include a painful mass that is red, warm, tender that might break open and HAVE drainage. If your health care provider gave you antibiotics make sure to take the full course and do not stop even if feeling better.     SEEK IMMEDIATE MEDICAL CARE IF YOU HAVE ANY OF THE FOLLOWING SYMPTOMS: chills, fever, muscle aches, or red streaking from the area.

## 2023-03-05 NOTE — ED PROVIDER NOTE - CLINICAL SUMMARY MEDICAL DECISION MAKING FREE TEXT BOX
Pt with hx of recurrent abscesses without any hx of DM and not on any medications for anything comes into the ED with 4cm abscess on the left posterior upper thigh. No surrounding cellulitis and no systemic symptoms of infection. Will do incision and drainage and DC pt with instructions to return to the ED or his PCP within 3 days for reevaluation. No need for abx at this time give the lack of surrounding cellulitis or systemic signs of infection. 5 sutures over healed incision superior to the gluteal cleft removed.

## 2023-03-05 NOTE — ED PROVIDER NOTE - PATIENT PORTAL LINK FT
You can access the FollowMyHealth Patient Portal offered by [facility] by registering at the following website: [email or web address]. By joining [facility]’s FollowMyHealth portal, you will also be able to view your health information using other applications (apps) compatible with our system.

## 2023-03-05 NOTE — ED PROVIDER NOTE - NS ED ROS FT
GENERAL: No fever, chills  HEENT: No cough, congestion,   CARDIAC: No chest pain, palpitations,   PULM: No dyspnea, cough,   GI: No abdominal pain, nausea, vomiting, diarrhea, constipation, melena, hematochezia  : No urinary dysuria, frequency, incontinence, hematuria  NEURO: No headache, motor weakness, sensory changes  MSK: No joint pain, back pain, pain in extremities  SKIN: + abscess to the posterior right upper thigh, no rashes, hives, urticaria  HEME: no active bleeding, bruising

## 2023-03-05 NOTE — ED ADULT TRIAGE NOTE - CHIEF COMPLAINT QUOTE
Pt c/o cyst to left inner thigh X 3 days. Denies fevers/chills. Endorsing redness and pain to area. Phx: pituitary tumor, seizures, pilonidal cyst, sciatica, smoker

## 2023-03-05 NOTE — ED PROVIDER NOTE - ATTENDING CONTRIBUTION TO CARE
I have personally performed a face to face medical and diagnostic evaluation of the patient. I have discussed with and reviewed the Resident's note and agree with the History, ROS, Physical Exam and MDM unless otherwise indicated. A brief summary of my personal evaluation and impression can be found below.    Susannah CHURCH: 53-year-old male, history of prior abscesses, pilonidal cyst, sciatica, presents with a chief complaint of posterior left thigh abscess that he noticed 3 days ago increasing and growing and is becoming  tender and more uncomfortable, prompting ED visit, no fever, no spreading erythema, no abscesses elsewhere, no urinary or bowel complaints otherwise, no fever no chills no chest pain no shortness of breath, can move everything if everything.    All other ROS negative, except as above and as per HPI and ROS section.    VITALS: Initial triage and subsequent vitals have been reviewed by me.  GEN APPEARANCE: WDWN, alert, non-toxic, NAD  HEAD: Atraumatic.  EYES: PERRLa, EOMI, vision grossly intact.   NECK: Supple  CV: RRR, S1S2, no c/r/m/g. Cap refill <2 seconds. No bruits.   LUNGS: CTAB. No abnormal breath sounds.  ABDOMEN: Soft, NTND. No guarding or rebound.   MSK/EXT: No spinal or extremity point tenderness. No CVA ttp. Pelvis stable. No peripheral edema.  NEURO: Alert, follows commands. Weight bearing normal. Speech normal. Sensation and motor normal x4 extremities.   SKIN: Warm, dry and intact. No rash. Approximately 3 x 3 circular cystlike structure to left posterior thigh with fluctuance at surface, minimal erythema and minimal surrounding induration.  Mildly tender.  PSYCH: Appropriate    Plan/MDM:    Susannah CHURCH: 53-year-old male, history of prior abscesses, pilonidal cyst, sciatica, presents with a chief complaint of posterior left thigh abscess that he noticed 3 days ago increasing and growing and is becoming  tender and more uncomfortable, prompting ED visit, no fever, no spreading erythema, no abscesses elsewhere, no urinary or bowel complaints otherwise, no fever no chills no chest pain no shortness of breath, can move everything if everything. Exam vital signs stable nontoxic-appearing physical exam as above, DDx concern for likely simple abscess, does not appear superficially infected nor is there evidence of surrounding cellulitis, patient does not have diabetes, and is not having any fevers, will perform incision and drainage, patient was instructed to return to ED in 2 days for wound check, patient reports understanding, anticipate discharge after I&D.

## 2023-03-05 NOTE — ED PROVIDER NOTE - PHYSICAL EXAMINATION
GENERAL: Not in acute distress, non-toxic appearing  HEAD: normocephalic, atraumatic  HEENT: EOMI, normal conjunctiva, oral mucosa moist, neck supple  CARDIAC: appears well perfused, regular rate  PULM: normal work of breathing  GI: abdomen nondistended, soft, nontender,   NEURO: alert and oriented x 3, normal speech, no focal motor or sensory deficits, gait normal, no gross neurologic deficit  MSK: No visible deformities, no peripheral edema,   SKIN: + 4 cm area of induration with fluctuance that is tender to palpation, no surrounding erythema, + 5 sutures placed superior to the gluteal cleft, incision is healed and no signs of infection such as erythema or discharge from sutures that are still in place. No visible rashes, dry, well-perfused  PSYCH: appropriate mood and affect

## 2023-06-08 NOTE — H&P PST ADULT - LIVES WITH, PROFILE
Clindamycin Pregnancy And Lactation Text: This medication can be used in pregnancy if certain situations. Clindamycin is also present in breast milk. alone
